# Patient Record
Sex: FEMALE | Race: OTHER | Employment: OTHER | URBAN - METROPOLITAN AREA
[De-identification: names, ages, dates, MRNs, and addresses within clinical notes are randomized per-mention and may not be internally consistent; named-entity substitution may affect disease eponyms.]

---

## 2017-03-05 ENCOUNTER — HOSPITAL ENCOUNTER (EMERGENCY)
Age: 32
Discharge: HOME | End: 2017-03-05
Attending: INTERNAL MEDICINE | Admitting: INTERNAL MEDICINE
Payer: MEDICAID

## 2017-03-05 VITALS
RESPIRATION RATE: 16 BRPM | WEIGHT: 183 LBS | OXYGEN SATURATION: 100 % | BODY MASS INDEX: 27.74 KG/M2 | DIASTOLIC BLOOD PRESSURE: 65 MMHG | HEART RATE: 82 BPM | HEIGHT: 68 IN | SYSTOLIC BLOOD PRESSURE: 131 MMHG | TEMPERATURE: 98.6 F

## 2017-03-05 DIAGNOSIS — R20.2 PARESTHESIA OF RIGHT FOOT: ICD-10-CM

## 2017-03-05 DIAGNOSIS — M54.31 SCIATICA, RIGHT SIDE: Primary | ICD-10-CM

## 2017-03-05 PROCEDURE — 99282 EMERGENCY DEPT VISIT SF MDM: CPT

## 2017-03-05 RX ORDER — HYDROCODONE BITARTRATE AND ACETAMINOPHEN 5; 325 MG/1; MG/1
1-2 TABLET ORAL
Qty: 12 TAB | Refills: 0 | Status: SHIPPED | OUTPATIENT
Start: 2017-03-05 | End: 2018-11-18

## 2017-03-05 RX ORDER — GABAPENTIN 300 MG/1
300 CAPSULE ORAL 3 TIMES DAILY
Qty: 90 CAP | Refills: 0 | Status: SHIPPED | OUTPATIENT
Start: 2017-03-05 | End: 2017-04-04

## 2017-03-05 RX ORDER — METHYLPREDNISOLONE 4 MG/1
TABLET ORAL
Qty: 1 DOSE PACK | Refills: 0 | Status: SHIPPED | OUTPATIENT
Start: 2017-03-05 | End: 2018-11-18

## 2017-03-05 NOTE — LETTER
[facility] [place] 
THE [facility] EMERGENCY DEPT 
[address] [address] 
[phone] Work/School Note Date: 3/5/2017 To Whom It May concern: 
 
Christiano Pandey was seen and treated today in the emergency room by the following provider(s): 
Attending Provider: Ivy Candelario MD 
Physician Assistant: Juan Vasquez PA-C. Christiano Pandey no heavy lifting for 7 days. Do not lift anything over 5 lbs. Sincerely, Guerita Allen PA-C

## 2017-03-05 NOTE — ED TRIAGE NOTES
C/o right foot pain and burning for 1 week, and right leg feels cold and like it has needles on it for 2 weeks. Pt seen at [facility] approx 2 weeks ago for the leg pain, denies having a family doctor to follow-up with. Denies injury.

## 2017-03-05 NOTE — ED PROVIDER NOTES
HPI Comments: 9:19 AM   Bony Dias is a 27 y.o. female presenting to the ED C/O burning right foot pain (8/10), onset 1 week ago. Symptoms include right leg \"coldness\" that onset 2 weeks ago. Pain is worse with walking and is only felt in the right foot/leg. Pt took 800 mg ibuprofen without relief. Pt was seen at [facility] 2 weeks ago for sharp groin pain, but did not follow up with a PCP or orthopedist. Pt had a negative hip x-ray and was prescribed 800 mh ibuprofen. Pt is unsure of diagnosis. 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 No trauma. Pt is otherwise healthy and without drug allergies. Pt denies h/o trauma, h/o DM, back pain, and any other Sx or complaints. Written by REINIER Lantigua, as dictated by Óscar Pierce PA-C. Patient is a 27 y.o. female presenting with leg pain. The history is provided by the patient. No  was used. Leg Pain    This is a new problem. The current episode started more than 1 week ago (1 week). The pain is present in the right foot. Associated symptoms include numbness (tingling). There has been no history of extremity trauma. Past Medical History:   Diagnosis Date    Anemia     Asthma     Genital herpes, unspecified     no outbreaks, dx by blood test    Rhesus isoimmunization unspecified as to episode of care in pregnancy        No past surgical history on file. Family History:   Problem Relation Age of Onset    Diabetes Maternal Grandmother     Diabetes Maternal Grandfather        Social History     Social History    Marital status: SINGLE     Spouse name: N/A    Number of children: N/A    Years of education: N/A     Occupational History    Not on file.      Social History Main Topics    Smoking status: Never Smoker    Smokeless tobacco: Never Used    Alcohol use No    Drug use: No    Sexual activity: Yes     Partners: Male     Birth control/ protection: Pill     Other Topics Concern    Not on file Social History Narrative         ALLERGIES: Latex    Review of Systems   Constitutional: Negative for fever. Musculoskeletal: Positive for arthralgias (Right foot). + Right leg \"coldness\"   Skin: Negative for color change and rash. Neurological: Positive for numbness (tingling). Negative for dizziness, weakness, light-headedness and headaches. Vitals:    03/05/17 0923   BP: 131/65   Pulse: 82   Resp: 16   Temp: 98.6 °F (37 °C)   SpO2: 100%   Weight: 83 kg (183 lb)   Height: 5' 8\" (1.727 m)            Physical Exam   Constitutional: She is oriented to person, place, and time. She appears well-developed and well-nourished. No distress. AA female in NAD. Alert. Ambulatory. Appears comfortable. HENT:   Head: Normocephalic and atraumatic. Right Ear: External ear normal.   Left Ear: External ear normal.   Nose: Nose normal.   Eyes: Conjunctivae are normal. Right eye exhibits no discharge. Left eye exhibits no discharge. No scleral icterus. Neck: Normal range of motion. Cardiovascular: Normal rate, regular rhythm, normal heart sounds and intact distal pulses. Exam reveals no gallop and no friction rub. No murmur heard. Pulses:       Dorsalis pedis pulses are 2+ on the right side, and 2+ on the left side. Posterior tibial pulses are 2+ on the right side, and 2+ on the left side. Pulmonary/Chest: Effort normal. No accessory muscle usage. No tachypnea. She has no decreased breath sounds. She has no rhonchi. Musculoskeletal: Normal range of motion. Right upper leg: She exhibits no tenderness, no bony tenderness, no swelling and no edema. Right lower leg: She exhibits no tenderness, no bony tenderness, no swelling, no edema and no deformity. Right foot: There is tenderness. There is normal range of motion, no bony tenderness, no swelling and no deformity. Neurological: She is alert and oriented to person, place, and time. Skin: Skin is warm and dry.  No rash noted. She is not diaphoretic. No erythema. Psychiatric: She has a normal mood and affect. Judgment normal.   Nursing note and vitals reviewed. RESULTS:    No orders to display        Labs Reviewed - No data to display    No results found for this or any previous visit (from the past 12 hour(s)). MDM  Number of Diagnoses or Management Options  Paresthesia of left foot:   Sciatica of left side:   Diagnosis management comments: Sciatica, gout, cellulitis, neuropathy, tendonitis, OA. Not c/w ischemic limb, compartment syndrome, septic joint, cellulitis. ED Course     MEDICATIONS GIVEN:  Medications - No data to display     Procedures    PROGRESS NOTE:   9:19 AM   Initial assessment performed. Written by Nathanael Pena ED Scribe, as dictated by Syeda Levine PA-C. Suspect sciatic as radiates from R hip. NVI. No evidence of infectious process. FU with Ortho. Not c/w ischemic limb, compartment syndrome, septic joint, or cellulitis. Reasons to RTED discussed with pt. All questions answered. Pt feels comfortable going home at this time. Pt expressed understanding and she agrees with plan. DISCHARGE NOTE:  9:44 AM   Bassam Lara  results have been reviewed with her. She has been counseled regarding her diagnosis, treatment, and plan. She verbally conveys understanding and agreement of the signs, symptoms, diagnosis, treatment and prognosis and additionally agrees to follow up as discussed. She also agrees with the care-plan and conveys that all of her questions have been answered. I have also provided discharge instructions for her that include: educational information regarding their diagnosis and treatment, and list of reasons why they would want to return to the ED prior to their follow-up appointment, should her condition change. The patient and/or family have been provided with education for proper Emergency Department utilization.      CLINICAL IMPRESSION:    1. Sciatica, right side    2. Paresthesia of right foot        AFTER VISIT PLAN:    Discharge Medication List as of 3/5/2017  9:53 AM      START taking these medications    Details   gabapentin (NEURONTIN) 300 mg capsule Take 1 Cap by mouth three (3) times daily for 30 days. Indications: NEUROPATHIC PAIN, Normal, Disp-90 Cap, R-0      methylPREDNISolone (MEDROL, KAMRAN,) 4 mg tablet Take with food. Start ibuprofen after this medication is complete., Normal, Disp-1 Dose Pack, R-0      HYDROcodone-acetaminophen (NORCO) 5-325 mg per tablet Take 1-2 Tabs by mouth every four (4) hours as needed for Pain. Max Daily Amount: 12 Tabs., Print, Disp-12 Tab, R-0         CONTINUE these medications which have NOT CHANGED    Details   IBUPROFEN (MOTRIN PO) Take 800 mg by mouth., Historical Med      norethindrone-ethinyl estradiol (ORTHO-NOVUM 1-35 TAB, NORTREL 1-35 TAB) 1-35 mg-mcg per tablet Take 1 Tab by mouth daily. , Historical Med      PHENAZOPYRIDINE HCL (AZO PO) Take  by mouth daily. , Historical Med              Follow-up Information     Follow up With Details Comments Contact Info    Nakul Abbott MD  for orthopedic follow up. [address] [place] [address]  [phone]      [facility] EMERGENCY DEPT  As needed, If symptoms worsen [address] [address]  [phone]           Attestations: This note is prepared by Jordan Cervantes, acting as Scribe for Guerita Allen PA-C. Guerita Allen PA-C: The scribe's documentation has been prepared under my direction and personally reviewed by me in its entirety. I confirm that the note above accurately reflects all work, treatment, procedures, and medical decision making performed by me.

## 2017-03-05 NOTE — DISCHARGE INSTRUCTIONS
Sciatica: Care Instructions  Your Care Instructions    Sciatica (say \"wgj-NR-zj-kuh\") is an irritation of one of the sciatic nerves, which come from the spinal cord in the lower back. The sciatic nerves and their branches extend down through the buttock to the foot. Sciatica can develop when an injured disc in the back presses against a spinal nerve root. Its main symptom is pain, numbness, or weakness that is often worse in the leg or foot than in the back. Sciatica often will improve and go away with time. Early treatment usually includes medicines and exercises to relieve pain. Follow-up care is a key part of your treatment and safety. Be sure to make and go to all appointments, and call your doctor if you are having problems. It's also a good idea to know your test results and keep a list of the medicines you take. How can you care for yourself at home? · Take pain medicines exactly as directed. ¨ If the doctor gave you a prescription medicine for pain, take it as prescribed. ¨ If you are not taking a prescription pain medicine, ask your doctor if you can take an over-the-counter medicine. · Use heat or ice to relieve pain. ¨ To apply heat, put a warm water bottle, heating pad set on low, or warm cloth on your back. Do not go to sleep with a heating pad on your skin. ¨ To use ice, put ice or a cold pack on the area for 10 to 20 minutes at a time. Put a thin cloth between the ice and your skin. · Avoid sitting if possible, unless it feels better than standing. · Alternate lying down with short walks. Increase your walking distance as you are able to without making your symptoms worse. · Do not do anything that makes your symptoms worse. When should you call for help? Call 911 anytime you think you may need emergency care. For example, call if:  · You are unable to move a leg at all.   Call your doctor now or seek immediate medical care if:  · You have new or worse symptoms in your legs or buttocks. Symptoms may include:  ¨ Numbness or tingling. ¨ Weakness. ¨ Pain. · You lose bladder or bowel control. Watch closely for changes in your health, and be sure to contact your doctor if:  · You are not getting better as expected. Where can you learn more? Go to http://bo-cachorro.info/. Enter [phone] in the search box to learn more about \"Sciatica: Care Instructions. \"  Current as of: May 23, 2016  Content Version: 11.1  © 1274-5459 [employer]. Care instructions adapted under license by [employer] (which disclaims liability or warranty for this information). If you have questions about a medical condition or this instruction, always ask your healthcare professional. [address] any warranty or liability for your use of this information.

## 2018-11-18 ENCOUNTER — HOSPITAL ENCOUNTER (EMERGENCY)
Age: 33
Discharge: HOME | End: 2018-11-18
Attending: EMERGENCY MEDICINE
Payer: MEDICAID

## 2018-11-18 VITALS
OXYGEN SATURATION: 100 % | RESPIRATION RATE: 16 BRPM | HEIGHT: 68 IN | TEMPERATURE: 98.5 F | DIASTOLIC BLOOD PRESSURE: 73 MMHG | BODY MASS INDEX: 29.07 KG/M2 | WEIGHT: 191.8 LBS | HEART RATE: 84 BPM | SYSTOLIC BLOOD PRESSURE: 120 MMHG

## 2018-11-18 DIAGNOSIS — N30.01 ACUTE CYSTITIS WITH HEMATURIA: Primary | ICD-10-CM

## 2018-11-18 DIAGNOSIS — Z3A.01 LESS THAN 8 WEEKS GESTATION OF PREGNANCY: ICD-10-CM

## 2018-11-18 DIAGNOSIS — O03.9 MISCARRIAGE: ICD-10-CM

## 2018-11-18 LAB
ABO + RH BLD: NORMAL
ALBUMIN SERPL-MCNC: 3.7 G/DL (ref 3.4–5)
ALBUMIN/GLOB SERPL: 1 {RATIO} (ref 0.8–1.7)
ALP SERPL-CCNC: 47 U/L (ref 45–117)
ALT SERPL-CCNC: 20 U/L (ref 13–56)
ANION GAP SERPL CALC-SCNC: 11 MMOL/L (ref 3–18)
APPEARANCE UR: ABNORMAL
AST SERPL-CCNC: 18 U/L (ref 15–37)
BACTERIA URNS QL MICRO: ABNORMAL /HPF
BASOPHILS # BLD: 0 K/UL (ref 0–0.1)
BASOPHILS NFR BLD: 0 % (ref 0–2)
BILIRUB SERPL-MCNC: 0.6 MG/DL (ref 0.2–1)
BILIRUB UR QL: ABNORMAL
BLOOD BANK CMNT PATIENT-IMP: NORMAL
BLOOD GROUP ANTIBODIES SERPL: NORMAL
BUN SERPL-MCNC: 11 MG/DL (ref 7–18)
BUN/CREAT SERPL: 15
CALCIUM SERPL-MCNC: 8.3 MG/DL (ref 8.5–10.1)
CHLORIDE SERPL-SCNC: 109 MMOL/L (ref 100–108)
CO2 SERPL-SCNC: 25 MMOL/L (ref 21–32)
COLOR UR: ABNORMAL
CREAT SERPL-MCNC: 0.72 MG/DL (ref 0.6–1.3)
DIFFERENTIAL METHOD BLD: NORMAL
EOSINOPHIL # BLD: 0.1 K/UL (ref 0–0.4)
EOSINOPHIL NFR BLD: 2 % (ref 0–5)
EPITH CASTS URNS QL MICRO: ABNORMAL /LPF (ref 0–5)
ERYTHROCYTE [DISTWIDTH] IN BLOOD BY AUTOMATED COUNT: 12.4 % (ref 11.6–14.5)
GLOBULIN SER CALC-MCNC: 3.7 G/DL (ref 2–4)
GLUCOSE SERPL-MCNC: 94 MG/DL (ref 74–99)
GLUCOSE UR STRIP.AUTO-MCNC: NEGATIVE MG/DL
HCG SERPL-ACNC: 4 MIU/ML (ref 0–10)
HCG UR QL: NEGATIVE
HCT VFR BLD AUTO: 38.9 % (ref 35–45)
HGB BLD-MCNC: 12.9 G/DL (ref 12–16)
HGB UR QL STRIP: NEGATIVE
KETONES UR QL STRIP.AUTO: ABNORMAL MG/DL
KOH PREP SPEC: NORMAL
LEUKOCYTE ESTERASE UR QL STRIP.AUTO: ABNORMAL
LIPASE SERPL-CCNC: 152 U/L (ref 73–393)
LYMPHOCYTES # BLD: 1.6 K/UL (ref 0.9–3.6)
LYMPHOCYTES NFR BLD: 22 % (ref 21–52)
MCH RBC QN AUTO: 29.7 PG (ref 24–34)
MCHC RBC AUTO-ENTMCNC: 33.2 G/DL (ref 31–37)
MCV RBC AUTO: 89.6 FL (ref 74–97)
MONOCYTES # BLD: 0.6 K/UL (ref 0.05–1.2)
MONOCYTES NFR BLD: 9 % (ref 3–10)
MUCOUS THREADS URNS QL MICRO: ABNORMAL /LPF
NEUTS SEG # BLD: 4.9 K/UL (ref 1.8–8)
NEUTS SEG NFR BLD: 67 % (ref 40–73)
NITRITE UR QL STRIP.AUTO: POSITIVE
PH UR STRIP: 7 [PH] (ref 5–8)
PLATELET # BLD AUTO: 313 K/UL (ref 135–420)
PMV BLD AUTO: 9.4 FL (ref 9.2–11.8)
POTASSIUM SERPL-SCNC: 3.7 MMOL/L (ref 3.5–5.5)
PROT SERPL-MCNC: 7.4 G/DL (ref 6.4–8.2)
PROT UR STRIP-MCNC: 100 MG/DL
RBC # BLD AUTO: 4.34 M/UL (ref 4.2–5.3)
RBC #/AREA URNS HPF: ABNORMAL /HPF (ref 0–5)
SERVICE CMNT-IMP: NORMAL
SERVICE CMNT-IMP: NORMAL
SODIUM SERPL-SCNC: 145 MMOL/L (ref 136–145)
SP GR UR REFRACTOMETRY: 1.02 (ref 1–1.03)
UROBILINOGEN UR QL STRIP.AUTO: 4 EU/DL (ref 0.2–1)
WBC # BLD AUTO: 7.3 K/UL (ref 4.6–13.2)
WBC URNS QL MICRO: ABNORMAL /HPF (ref 0–5)
WET PREP GENITAL: NORMAL

## 2018-11-18 PROCEDURE — 99284 EMERGENCY DEPT VISIT MOD MDM: CPT

## 2018-11-18 PROCEDURE — 85025 COMPLETE CBC W/AUTO DIFF WBC: CPT

## 2018-11-18 PROCEDURE — 74011250636 HC RX REV CODE- 250/636: Performed by: EMERGENCY MEDICINE

## 2018-11-18 PROCEDURE — 96372 THER/PROPH/DIAG INJ SC/IM: CPT

## 2018-11-18 PROCEDURE — 81025 URINE PREGNANCY TEST: CPT

## 2018-11-18 PROCEDURE — 83690 ASSAY OF LIPASE: CPT

## 2018-11-18 PROCEDURE — 87210 SMEAR WET MOUNT SALINE/INK: CPT

## 2018-11-18 PROCEDURE — 81001 URINALYSIS AUTO W/SCOPE: CPT

## 2018-11-18 PROCEDURE — 74011250637 HC RX REV CODE- 250/637: Performed by: EMERGENCY MEDICINE

## 2018-11-18 PROCEDURE — 86900 BLOOD TYPING SEROLOGIC ABO: CPT

## 2018-11-18 PROCEDURE — 86850 RBC ANTIBODY SCREEN: CPT

## 2018-11-18 PROCEDURE — 84702 CHORIONIC GONADOTROPIN TEST: CPT

## 2018-11-18 PROCEDURE — 80053 COMPREHEN METABOLIC PANEL: CPT

## 2018-11-18 PROCEDURE — 87491 CHLMYD TRACH DNA AMP PROBE: CPT

## 2018-11-18 RX ORDER — ACETAMINOPHEN 500 MG
1000 TABLET ORAL
Status: COMPLETED | OUTPATIENT
Start: 2018-11-18 | End: 2018-11-18

## 2018-11-18 RX ORDER — ONDANSETRON 2 MG/ML
4 INJECTION INTRAMUSCULAR; INTRAVENOUS
Status: DISCONTINUED | OUTPATIENT
Start: 2018-11-18 | End: 2018-11-18 | Stop reason: HOSPADM

## 2018-11-18 RX ADMIN — ACETAMINOPHEN 1000 MG: 500 TABLET ORAL at 09:50

## 2018-11-18 RX ADMIN — HUMAN RHO(D) IMMUNE GLOBULIN 0.3 MG: 300 INJECTION, SOLUTION INTRAMUSCULAR at 10:57

## 2018-11-18 NOTE — ED PROVIDER NOTES
EMERGENCY DEPARTMENT HISTORY AND PHYSICAL EXAM 
 
Date: 2018 Patient Name: Clarissa Dillon History of Presenting Illness Chief Complaint Patient presents with  Vaginal Bleeding  Abdominal Cramping History Provided By: Patient Chief Complaint: lower abd pain Duration: since yesterday Timing:  Gradually worsening Location: lower abdomen Quality: Cramping Associated Symptoms: nausea (morning) and vaginal bleeding Additional History (Context):  
8:13 AM 
Clarissa Dillon is a  28 y.o. female with PMHX of asthma and anemia who presents to the emergency department C/O cramping lower abd pain since yesterday. Associated sxs include nausea (morning) and vaginal bleeding. Pt states she is almost soaking through 2 pads at home. Pt states she took a home pregnancy test yesterday which confirmed pregnancy and another done this morning which was negative. She did the same this morning and had the same results with one confirming pregnancy and one negative. LNMP was 10/16/2018. Pt blood type is A negative. Pt is sexually active without using contraception. Allergy reported to Latex. Pt does not endorse tobacco product use, EtOH user, or recreational drug user. No PSHx. Pt denies vomiting, fever, chills, and any other sxs or complaints. PCP: None Current Facility-Administered Medications Medication Dose Route Frequency Provider Last Rate Last Dose  ondansetron (ZOFRAN) injection 4 mg  4 mg IntraVENous NOW Sotero Aguilar MD      
 Rho D immune globulin (RHOGAM) 1,500 unit (300 mcg) injection 0.3 mg  300 mcg IntraMUSCular ONCE Buddy Dorsey MD      
 
 
Past History Past Medical History: 
Past Medical History:  
Diagnosis Date  Anemia  Asthma  Genital herpes, unspecified   
 no outbreaks, dx by blood test  
 Rhesus isoimmunization unspecified as to episode of care in pregnancy Past Surgical History: History reviewed. No pertinent surgical history. Family History: 
Family History Problem Relation Age of Onset  Diabetes Maternal Grandmother  Diabetes Maternal Grandfather Social History: 
Social History Tobacco Use  Smoking status: Never Smoker  Smokeless tobacco: Never Used Substance Use Topics  Alcohol use: No  
 Drug use: No  
 
 
Allergies: Allergies Allergen Reactions  Latex Rash Review of Systems Review of Systems Constitutional: Negative for chills and fever. Gastrointestinal: Positive for abdominal pain and nausea. Negative for vomiting. Genitourinary: Positive for vaginal bleeding. All other systems reviewed and are negative. Physical Exam  
 
Vitals:  
 11/18/18 7254 BP: 120/73 Pulse: 84 Resp: 16 Temp: 98.5 °F (36.9 °C) SpO2: 100% Weight: 87 kg (191 lb 12.8 oz) Height: 5' 8\" (1.727 m) Physical Exam  
Nursing note and vitals reviewed. Vital signs and nursing notes reviewed CONSTITUTIONAL: Alert, in no apparent distress; well-developed; well-nourished. HEAD:  Normocephalic, atraumatic EYES: PERRL; EOM's intact. ENTM: Nose: no rhinorrhea; Throat: no erythema or exudate, mucous membranes moist 
Neck:  No JVD, supple without lymphadenopathy RESP: Chest clear, equal breath sounds. CV: S1 and S2 WNL; No murmurs, gallops or rubs. GI: Normal bowel sounds, abdomen soft. Suprapubic tenderness. No masses or organomegaly. : No costo-vertebral angle tenderness. BACK:  Non-tender UPPER EXT:  Normal inspection LOWER EXT: No edema, no calf tenderness. Distal pulses intact. NEURO: CN intact, reflexes 2/4 and sym, strength 5/5 and sym, sensation intact. SKIN: No rashes; Normal for age and stage. PSYCH:  Alert and oriented, normal affect. Diagnostic Study Results Labs - Recent Results (from the past 12 hour(s)) URINALYSIS W/ RFLX MICROSCOPIC Collection Time: 11/18/18  8:05 AM  
Result Value Ref Range Color RED Appearance CLOUDY Specific gravity 1.025 1.003 - 1.030    
 pH (UA) 7.0 5.0 - 8.0 Protein 100 (A) NEG mg/dL Glucose NEGATIVE  NEG mg/dL Ketone TRACE (A) NEG mg/dL Bilirubin LARGE (A) NEG Blood NEGATIVE  NEG Urobilinogen 4.0 (H) 0.2 - 1.0 EU/dL Nitrites POSITIVE (A) NEG Leukocyte Esterase SMALL (A) NEG URINE MICROSCOPIC ONLY Collection Time: 11/18/18  8:05 AM  
Result Value Ref Range WBC 4 to 10 0 - 5 /hpf  
 RBC TOO NUMEROUS TO COUNT 0 - 5 /hpf Epithelial cells 2+ 0 - 5 /lpf Bacteria 1+ (A) NEG /hpf Mucus 1+ (A) NEG /lpf  
HCG URINE, QL. - POC Collection Time: 11/18/18  8:14 AM  
Result Value Ref Range Pregnancy test,urine (POC) NEGATIVE  NEG    
CBC WITH AUTOMATED DIFF Collection Time: 11/18/18  8:30 AM  
Result Value Ref Range WBC 7.3 4.6 - 13.2 K/uL  
 RBC 4.34 4.20 - 5.30 M/uL  
 HGB 12.9 12.0 - 16.0 g/dL HCT 38.9 35.0 - 45.0 % MCV 89.6 74.0 - 97.0 FL  
 MCH 29.7 24.0 - 34.0 PG  
 MCHC 33.2 31.0 - 37.0 g/dL  
 RDW 12.4 11.6 - 14.5 % PLATELET 110 790 - 997 K/uL MPV 9.4 9.2 - 11.8 FL  
 NEUTROPHILS 67 40 - 73 % LYMPHOCYTES 22 21 - 52 % MONOCYTES 9 3 - 10 % EOSINOPHILS 2 0 - 5 % BASOPHILS 0 0 - 2 %  
 ABS. NEUTROPHILS 4.9 1.8 - 8.0 K/UL  
 ABS. LYMPHOCYTES 1.6 0.9 - 3.6 K/UL  
 ABS. MONOCYTES 0.6 0.05 - 1.2 K/UL  
 ABS. EOSINOPHILS 0.1 0.0 - 0.4 K/UL  
 ABS. BASOPHILS 0.0 0.0 - 0.1 K/UL  
 DF AUTOMATED METABOLIC PANEL, COMPREHENSIVE Collection Time: 11/18/18  8:30 AM  
Result Value Ref Range Sodium 145 136 - 145 mmol/L Potassium 3.7 3.5 - 5.5 mmol/L Chloride 109 (H) 100 - 108 mmol/L  
 CO2 25 21 - 32 mmol/L Anion gap 11 3.0 - 18 mmol/L Glucose 94 74 - 99 mg/dL BUN 11 7.0 - 18 MG/DL Creatinine 0.72 0.6 - 1.3 MG/DL  
 BUN/Creatinine ratio 15 GFR est AA >60 >60 ml/min/1.73m2 GFR est non-AA >60 >60 ml/min/1.73m2  Calcium 8.3 (L) 8.5 - 10.1 MG/DL  
 Bilirubin, total 0.6 0.2 - 1.0 MG/DL  
 ALT (SGPT) 20 13 - 56 U/L  
 AST (SGOT) 18 15 - 37 U/L Alk. phosphatase 47 45 - 117 U/L Protein, total 7.4 6.4 - 8.2 g/dL Albumin 3.7 3.4 - 5.0 g/dL Globulin 3.7 2.0 - 4.0 g/dL A-G Ratio 1.0 0.8 - 1.7 LIPASE Collection Time: 11/18/18  8:30 AM  
Result Value Ref Range Lipase 152 73 - 393 U/L  
BETA HCG, QT Collection Time: 11/18/18  8:30 AM  
Result Value Ref Range Beta HCG, QT 4 0 - 10 MIU/ML  
BLOOD TYPE, (ABO+RH) Collection Time: 11/18/18  8:30 AM  
Result Value Ref Range ABO/Rh(D) A NEGATIVE   
WET PREP Collection Time: 11/18/18  9:05 AM  
Result Value Ref Range Special Requests: NO SPECIAL REQUESTS Wet prep NO YEAST,TRICHOMONAS OR CLUE CELLS NOTED    
KOH, OTHER SOURCES Collection Time: 11/18/18  9:05 AM  
Result Value Ref Range Special Requests: NO SPECIAL REQUESTS    
 KOH NO FUNGAL ELEMENTS SEEN Radiologic Studies - No orders to display CT Results  (Last 48 hours) None CXR Results  (Last 48 hours) None Medications given in the ED- Medications  
ondansetron (ZOFRAN) injection 4 mg (4 mg IntraVENous Refused 11/18/18 0831) Rho D immune globulin (RHOGAM) 1,500 unit (300 mcg) injection 0.3 mg (not administered)  
acetaminophen (TYLENOL) tablet 1,000 mg (1,000 mg Oral Given 11/18/18 0950) Medical Decision Making I am the first provider for this patient. I reviewed the vital signs, available nursing notes, past medical history, past surgical history, family history and social history. Vital Signs-Reviewed the patient's vital signs. Pulse Oximetry Analysis - 100% on room air. Records Reviewed: Nursing Notes and Old Medical Records Provider Notes (Medical Decision Making): DDx: menses, miscarriage, early pregnancy, ectopic, UTI. Procedures: 
Procedures Exam was chaperoned by Ebonie Quiroz, female RN. Normal external genitalia. Painless. No masses. Blood and small clots in the vault. Normal cervix appearance. On bimanual exam, no cervical, uterine, or adnexal tenderness. ED Course:  
8:13 AM Initial assessment performed. The patients presenting problems have been discussed, and they are in agreement with the care plan formulated and outlined with them. I have encouraged them to ask questions as they arise throughout their visit. Discussion: 28year old female with a likely first trimester miscarriage A- and bleeding. Positive home pregnancy test. Will give Rhogam shot. Diagnosis and Disposition DISCHARGE NOTE: 
10:42 AM 
Mica Elder  results have been reviewed with her. She has been counseled regarding her diagnosis, treatment, and plan. She verbally conveys understanding and agreement of the signs, symptoms, diagnosis, treatment and prognosis and additionally agrees to follow up as discussed. She also agrees with the care-plan and conveys that all of her questions have been answered. I have also provided discharge instructions for her that include: educational information regarding their diagnosis and treatment, and list of reasons why they would want to return to the ED prior to their follow-up appointment, should her condition change. She has been provided with education for proper emergency department utilization. CLINICAL IMPRESSION: 
 
1. Acute cystitis with hematuria 2. Miscarriage 3. Less than 8 weeks gestation of pregnancy PLAN: 
1. D/C Home 2. There are no discharge medications for this patient. 3.  
Follow-up Information Follow up With Specialties Details Why Contact Pan Menendez Officer, MD Obstetrics & Gynecology, Gynecology, Obstetrics Schedule an appointment as soon as possible for a visit in 2 days For Ob/GYN Follow Up [address] 
[phone] [facility] EMERGENCY DEPT Emergency Medicine Go to If symptoms worsen, As needed [address] 
[address] [id number] 
[phone]  
  
 
_______________________________ Attestations: This note is prepared by Mariya Coker, acting as Scribe for Gabby Lema MD. 
 
Gabby Lema MD:  The scribe's documentation has been prepared under my direction and personally reviewed by me in its entirety. I confirm that the note above accurately reflects all work, treatment, procedures, and medical decision making performed by me. 
_______________________________

## 2018-11-18 NOTE — ED TRIAGE NOTES
Pt with complaints of suprapubic cramping starting yesterday. Pt states she took a pregnancy test yesterday with a positive result, but retook one this morning with negative result. Pt also reports vaginal bleeding starting this morning.

## 2018-11-18 NOTE — DISCHARGE INSTRUCTIONS
Miscarriage: Care Instructions  Your Care Instructions    The loss of a pregnancy can be very hard. You may wonder why it happened or blame yourself. Miscarriages are common and are not caused by exercise, stress, or sex. Most happen because the fertilized egg in the uterus does not develop normally. There is no treatment that can stop a miscarriage. As long as you do not have heavy blood loss, fever, weakness, or other signs of infection, you can let a miscarriage follow its own course. This can take several days. Your body will recover over the next several weeks. Having a miscarriage does not mean you cannot have a normal pregnancy in the future. The doctor has checked you carefully, but problems can develop later. If you notice any problems or new symptoms, get medical treatment right away. Follow-up care is a key part of your treatment and safety. Be sure to make and go to all appointments, and call your doctor if you are having problems. It's also a good idea to know your test results and keep a list of the medicines you take. How can you care for yourself at home? · You will probably have some vaginal bleeding for 1 to 2 weeks. It may be similar to or slightly heavier than a normal period. The bleeding should get lighter after a week. Use pads instead of tampons. You may use tampons during your next period, which should start in 3 to 6 weeks. · Take an over-the-counter pain medicine, such as acetaminophen (Tylenol), ibuprofen (Advil, Motrin), or naproxen (Aleve) for cramps. Read and follow all instructions on the label. You may have cramps for several days after the miscarriage. · Do not take two or more pain medicines at the same time unless the doctor told you to. Many pain medicines have acetaminophen, which is Tylenol. Too much acetaminophen (Tylenol) can be harmful. · Use a clear container to save any tissue that you pass. Take it to your doctor's office as soon as you can.   · Do not have sex until the bleeding stops. · You may return to your normal activities if you feel well enough to do so. But you should avoid heavy exercise until the bleeding stops. · If you plan to get pregnant again, check with your doctor. Most doctors suggest waiting until you have had at least one normal period before you try to get pregnant. · If you do not want to get pregnant, ask your doctor about birth control. You can get pregnant again before your next period starts if you are not using birth control. · You may be low in iron because of blood loss. Eat a balanced diet that is high in iron and vitamin C. Foods rich in iron include red meat, shellfish, eggs, beans, and leafy green vegetables. Foods high in vitamin C include citrus fruits, tomatoes, and broccoli. Talk to your doctor about whether you need to take iron pills or a multivitamin. · The loss of a pregnancy can be very hard. You may wonder why it happened and blame yourself. Talking to family members, friends, a counselor, or your doctor may help you cope with your loss. When should you call for help? Call 911 anytime you think you may need emergency care. For example, call if:    · You passed out (lost consciousness).    Call your doctor now or seek immediate medical care if:    · You have severe vaginal bleeding.     · You are dizzy or lightheaded, or you feel like you may faint.     · You have new or worse pain in your belly or pelvis.     · You have a fever.     · You have vaginal discharge that smells bad.    Watch closely for changes in your health, and be sure to contact your doctor if:    · You do not get better as expected. Where can you learn more? Go to http://bo-cachorro.info/. Enter E802 in the search box to learn more about \"Miscarriage: Care Instructions. \"  Current as of: November 21, 2017  Content Version: 11.8  © 7304-9546 [employer], [employer].  Care instructions adapted under license by [employer] (which disclaims liability or warranty for this information). If you have questions about a medical condition or this instruction, always ask your healthcare professional. [place] [address] any warranty or liability for your use of this information. No intercourse for 6 weeks. Urinary Tract Infection in Women: Care Instructions  Your Care Instructions    A urinary tract infection, or UTI, is a general term for an infection anywhere between the kidneys and the urethra (where urine comes out). Most UTIs are bladder infections. They often cause pain or burning when you urinate. UTIs are caused by bacteria and can be cured with antibiotics. Be sure to complete your treatment so that the infection goes away. Follow-up care is a key part of your treatment and safety. Be sure to make and go to all appointments, and call your doctor if you are having problems. It's also a good idea to know your test results and keep a list of the medicines you take. How can you care for yourself at home? · Take your antibiotics as directed. Do not stop taking them just because you feel better. You need to take the full course of antibiotics. · Drink extra water and other fluids for the next day or two. This may help wash out the bacteria that are causing the infection. (If you have kidney, heart, or liver disease and have to limit fluids, talk with your doctor before you increase your fluid intake.)  · Avoid drinks that are carbonated or have caffeine. They can irritate the bladder. · Urinate often. Try to empty your bladder each time. · To relieve pain, take a hot bath or lay a heating pad set on low over your lower belly or genital area. Never go to sleep with a heating pad in place. To prevent UTIs  · Drink plenty of water each day. This helps you urinate often, which clears bacteria from your system.  (If you have kidney, heart, or liver disease and have to limit fluids, talk with your doctor before you increase your fluid intake.)  · Urinate when you need to. · Urinate right after you have sex. · Change sanitary pads often. · Avoid douches, bubble baths, feminine hygiene sprays, and other feminine hygiene products that have deodorants. · After going to the bathroom, wipe from front to back. When should you call for help? Call your doctor now or seek immediate medical care if:    · Symptoms such as fever, chills, nausea, or vomiting get worse or appear for the first time.     · You have new pain in your back just below your rib cage. This is called flank pain.     · There is new blood or pus in your urine.     · You have any problems with your antibiotic medicine.    Watch closely for changes in your health, and be sure to contact your doctor if:    · You are not getting better after taking an antibiotic for 2 days.     · Your symptoms go away but then come back. Where can you learn more? Go to http://bo-cachorro.info/. Enter O257 in the search box to learn more about \"Urinary Tract Infection in Women: Care Instructions. \"  Current as of: March 21, 2018  Content Version: 11.8  © 3649-0120 [employer]. Care instructions adapted under license by [employer] (which disclaims liability or warranty for this information). If you have questions about a medical condition or this instruction, always ask your healthcare professional. [address] any warranty or liability for your use of this information.

## 2018-11-19 LAB
BLD PROD TYP BPU: NORMAL
BPU ID: NORMAL
C TRACH RRNA SPEC QL NAA+PROBE: NEGATIVE
CALLED TO:,BCALL1: NORMAL
N GONORRHOEA RRNA SPEC QL NAA+PROBE: NEGATIVE
SPECIMEN SOURCE: NORMAL
STATUS OF UNIT,%ST: NORMAL
UNIT DIVISION, %UDIV: 0

## 2019-06-24 ENCOUNTER — HOSPITAL ENCOUNTER (OUTPATIENT)
Dept: INFUSION THERAPY | Age: 34
Discharge: HOME | End: 2019-06-24
Payer: MEDICAID

## 2019-06-24 VITALS
DIASTOLIC BLOOD PRESSURE: 57 MMHG | OXYGEN SATURATION: 100 % | RESPIRATION RATE: 18 BRPM | HEART RATE: 98 BPM | SYSTOLIC BLOOD PRESSURE: 127 MMHG | TEMPERATURE: 98.6 F

## 2019-06-24 LAB
25(OH)D3 SERPL-MCNC: 22 NG/ML (ref 30–100)
ABO + RH BLD: NORMAL
BLOOD GROUP ANTIBODIES SERPL: NORMAL
ERYTHROCYTE [DISTWIDTH] IN BLOOD BY AUTOMATED COUNT: 12.8 % (ref 11.6–14.5)
GLUCOSE 1H P 100 G GLC PO SERPL-MCNC: 129 MG/DL (ref 60–140)
HCT VFR BLD AUTO: 30.8 % (ref 35–45)
HGB BLD-MCNC: 10.3 G/DL (ref 12–16)
MCH RBC QN AUTO: 30.1 PG (ref 24–34)
MCHC RBC AUTO-ENTMCNC: 33.4 G/DL (ref 31–37)
MCV RBC AUTO: 90.1 FL (ref 74–97)
PLATELET # BLD AUTO: 291 K/UL (ref 135–420)
PMV BLD AUTO: 9.2 FL (ref 9.2–11.8)
RBC # BLD AUTO: 3.42 M/UL (ref 4.2–5.3)
RPR SER QL: NONREACTIVE
SPECIMEN EXP DATE BLD: NORMAL
WBC # BLD AUTO: 8 K/UL (ref 4.6–13.2)

## 2019-06-24 PROCEDURE — 36415 COLL VENOUS BLD VENIPUNCTURE: CPT

## 2019-06-24 PROCEDURE — 86900 BLOOD TYPING SEROLOGIC ABO: CPT

## 2019-06-24 PROCEDURE — 85027 COMPLETE CBC AUTOMATED: CPT

## 2019-06-24 PROCEDURE — 86592 SYPHILIS TEST NON-TREP QUAL: CPT

## 2019-06-24 PROCEDURE — 82950 GLUCOSE TEST: CPT

## 2019-06-24 PROCEDURE — 82306 VITAMIN D 25 HYDROXY: CPT

## 2019-06-24 PROCEDURE — 96372 THER/PROPH/DIAG INJ SC/IM: CPT

## 2019-06-24 PROCEDURE — 74011250636 HC RX REV CODE- 250/636: Performed by: OBSTETRICS & GYNECOLOGY

## 2019-06-24 RX ADMIN — HUMAN RHO(D) IMMUNE GLOBULIN 0.3 MG: 300 INJECTION, SOLUTION INTRAMUSCULAR at 14:56

## 2019-06-24 NOTE — PROGRESS NOTES
[facility] Progress Note    Date: 2019    Name: Pastor Turner    MRN: [id number]         : 1985    Rhogam Injection      Ms. Barbosa to [facility], ambulatory at 1430. Pt was assessed and education was provided. Pt given written info about Rhogam; explained reason for giving to Rh negative mothers. Pt verbalized understanding and signed consent form. Ms. Albarado  vitals were reviewed and patient was observed for 5 minutes prior to treatment. Visit Vitals  /57 (BP 1 Location: Right arm)   Pulse 98   Temp 98.6 °F (37 °C)   Resp 18   SpO2 100%   Breastfeeding? No       Lab results were obtained and reviewed. Recent Results (from the past 12 hour(s))   TYPE & SCREEN    Collection Time: 19  1:03 PM   Result Value Ref Range    Crossmatch Expiration 2019     ABO/Rh(D) A NEGATIVE     Antibody screen NEG    RH IMMUNE GLOBULIN PROPHYLACTIC    Collection Time: 19  1:03 PM   Result Value Ref Range    No. of weeks gestation 27      Unit number [id number]     Blood component type RH IMMUNE GLOBULIN     Unit division 00     Status of unit ISSUED      Pt's blood type is A negative, which is within ordered parameters to give Rhogam today. Rhogam 300 mcg was administered IM in left deltoid. No irritation or bleeding noted at site. Bandaid applied. Ms. Gisele Agee tolerated well, and had no complaints. Pt observed for 30 minutes post-injection. She denied itching/hives/rash, SOB, difficulty swallowing or speaking, chest pain, any swelling or any other signs of allergic reaction. Pt given printed copy of discharge instructions; reviewed with her symptoms that require medical attention. Pt given Rhogam card with lot #, expiration date, etc.     Patient armband removed and shredded. Ms. Gisele Agee was discharged from [place] [address] in stable condition at 1500. She is to follow-up with Dr. Jitendra Parr as instructed.     Can Arguello RN  2019

## 2019-06-25 LAB
BLD PROD TYP BPU: NORMAL
BPU ID: NORMAL
GA (WEEKS): 27 WK
STATUS OF UNIT,%ST: NORMAL
UNIT DIVISION, %UDIV: 0

## 2021-06-01 ENCOUNTER — HOSPITAL ENCOUNTER (EMERGENCY)
Dept: ULTRASOUND IMAGING | Age: 36
Discharge: HOME | End: 2021-06-01
Attending: EMERGENCY MEDICINE
Payer: MEDICAID

## 2021-06-01 ENCOUNTER — HOSPITAL ENCOUNTER (EMERGENCY)
Age: 36
Discharge: HOME | End: 2021-06-01
Attending: EMERGENCY MEDICINE
Payer: MEDICAID

## 2021-06-01 VITALS
HEIGHT: 68 IN | SYSTOLIC BLOOD PRESSURE: 147 MMHG | RESPIRATION RATE: 18 BRPM | HEART RATE: 78 BPM | DIASTOLIC BLOOD PRESSURE: 111 MMHG | OXYGEN SATURATION: 100 % | TEMPERATURE: 97.3 F | BODY MASS INDEX: 27.58 KG/M2 | WEIGHT: 182 LBS

## 2021-06-01 DIAGNOSIS — R10.2 PELVIC CRAMPING: ICD-10-CM

## 2021-06-01 DIAGNOSIS — O02.1 FETAL DEMISE BEFORE 20 WEEKS WITH RETENTION OF DEAD FETUS: Primary | ICD-10-CM

## 2021-06-01 LAB
ABO + RH BLD: NORMAL
ALBUMIN SERPL-MCNC: 3.9 G/DL (ref 3.4–5)
ALBUMIN/GLOB SERPL: 1.1 {RATIO} (ref 0.8–1.7)
ALP SERPL-CCNC: 41 U/L (ref 45–117)
ALT SERPL-CCNC: 15 U/L (ref 13–56)
ANION GAP SERPL CALC-SCNC: 7 MMOL/L (ref 3–18)
APPEARANCE UR: ABNORMAL
AST SERPL-CCNC: 11 U/L (ref 10–38)
BASOPHILS # BLD: 0 K/UL (ref 0–0.1)
BASOPHILS NFR BLD: 1 % (ref 0–2)
BILIRUB SERPL-MCNC: 0.7 MG/DL (ref 0.2–1)
BILIRUB UR QL: NEGATIVE
BLOOD GROUP ANTIBODIES SERPL: NORMAL
BUN SERPL-MCNC: 11 MG/DL (ref 7–18)
BUN/CREAT SERPL: 16 (ref 12–20)
CALCIUM SERPL-MCNC: 9 MG/DL (ref 8.5–10.1)
CHLORIDE SERPL-SCNC: 105 MMOL/L (ref 100–111)
CO2 SERPL-SCNC: 27 MMOL/L (ref 21–32)
COLOR UR: YELLOW
CREAT SERPL-MCNC: 0.69 MG/DL (ref 0.6–1.3)
DIFFERENTIAL METHOD BLD: ABNORMAL
EOSINOPHIL # BLD: 0.2 K/UL (ref 0–0.4)
EOSINOPHIL NFR BLD: 3 % (ref 0–5)
ERYTHROCYTE [DISTWIDTH] IN BLOOD BY AUTOMATED COUNT: 11.8 % (ref 11.6–14.5)
GLOBULIN SER CALC-MCNC: 3.7 G/DL (ref 2–4)
GLUCOSE SERPL-MCNC: 101 MG/DL (ref 74–99)
GLUCOSE UR STRIP.AUTO-MCNC: NEGATIVE MG/DL
HCG SERPL-ACNC: ABNORMAL MIU/ML (ref 0–10)
HCT VFR BLD AUTO: 37.9 % (ref 35–45)
HGB BLD-MCNC: 12.5 G/DL (ref 12–16)
HGB UR QL STRIP: NEGATIVE
KETONES UR QL STRIP.AUTO: ABNORMAL MG/DL
LEUKOCYTE ESTERASE UR QL STRIP.AUTO: NEGATIVE
LYMPHOCYTES # BLD: 2.3 K/UL (ref 0.9–3.6)
LYMPHOCYTES NFR BLD: 39 % (ref 21–52)
MCH RBC QN AUTO: 29.9 PG (ref 24–34)
MCHC RBC AUTO-ENTMCNC: 33 G/DL (ref 31–37)
MCV RBC AUTO: 90.7 FL (ref 74–97)
MONOCYTES # BLD: 0.6 K/UL (ref 0.05–1.2)
MONOCYTES NFR BLD: 10 % (ref 3–10)
NEUTS SEG # BLD: 2.9 K/UL (ref 1.8–8)
NEUTS SEG NFR BLD: 47 % (ref 40–73)
NITRITE UR QL STRIP.AUTO: NEGATIVE
PH UR STRIP: 6.5 [PH] (ref 5–8)
PLATELET # BLD AUTO: 313 K/UL (ref 135–420)
PMV BLD AUTO: 9.3 FL (ref 9.2–11.8)
POTASSIUM SERPL-SCNC: 3.4 MMOL/L (ref 3.5–5.5)
PROT SERPL-MCNC: 7.6 G/DL (ref 6.4–8.2)
PROT UR STRIP-MCNC: NEGATIVE MG/DL
RBC # BLD AUTO: 4.18 M/UL (ref 4.2–5.3)
SODIUM SERPL-SCNC: 139 MMOL/L (ref 136–145)
SP GR UR REFRACTOMETRY: 1.03 (ref 1–1.03)
SPECIMEN EXP DATE BLD: NORMAL
UROBILINOGEN UR QL STRIP.AUTO: 1 EU/DL (ref 0.2–1)
WBC # BLD AUTO: 6.1 K/UL (ref 4.6–13.2)

## 2021-06-01 PROCEDURE — 80053 COMPREHEN METABOLIC PANEL: CPT

## 2021-06-01 PROCEDURE — 81003 URINALYSIS AUTO W/O SCOPE: CPT

## 2021-06-01 PROCEDURE — 86901 BLOOD TYPING SEROLOGIC RH(D): CPT

## 2021-06-01 PROCEDURE — 93975 VASCULAR STUDY: CPT

## 2021-06-01 PROCEDURE — 84702 CHORIONIC GONADOTROPIN TEST: CPT

## 2021-06-01 PROCEDURE — 85025 COMPLETE CBC W/AUTO DIFF WBC: CPT

## 2021-06-01 PROCEDURE — 99283 EMERGENCY DEPT VISIT LOW MDM: CPT

## 2021-06-01 NOTE — ED PROVIDER NOTES
EMERGENCY DEPARTMENT HISTORY AND PHYSICAL EXAM    Date: 6/1/2021  Patient Name: Lisseth Pittman    History of Presenting Illness     Chief Complaint   Patient presents with    Pregnancy Problem         History Provided By: Patient    Lisseth Pittman is a 38 y.o. female who presents to the emergency department C/O pregnancy problem. Patient states she is currently 11 weeks pregnant. States she is having some spotting and pelvic cramping. Patient states her symptoms started this morning. She does admit to being \"intermittent \"with her partner yesterday prior to the symptoms starting. States this is her sixth pregnancy, has 4 children and has had 1 miscarriage. She states she was concern for possible miscarriage. States she is following with an OB/GYN for this patient and has had an ultrasound around 7 to 8 weeks and was normal at that time. No other complaints. Patient states she called her OB/GYN who recommended she come to the emergency department for evaluation        PCP: Billie Chance, DO    Current Outpatient Medications   Medication Sig Dispense Refill    PNV66-Iron Fumarate-FA-DSS-DHA 26-1.2- mg cap Take  by mouth. Past History     Past Medical History:  Past Medical History:   Diagnosis Date    Anemia     Asthma     Genital herpes, unspecified     no outbreaks, dx by blood test    Rhesus isoimmunization unspecified as to episode of care in pregnancy        Past Surgical History:  History reviewed. No pertinent surgical history. Family History:  Family History   Problem Relation Age of Onset    Diabetes Maternal Grandmother     Diabetes Maternal Grandfather        Social History:  Social History     Tobacco Use    Smoking status: Never Smoker    Smokeless tobacco: Never Used   Substance Use Topics    Alcohol use: No    Drug use: No       Allergies:   Allergies   Allergen Reactions    Latex Rash         Review of Systems   Review of Systems   Constitutional: Negative for fever. Respiratory: Negative for shortness of breath. Cardiovascular: Negative for chest pain. Gastrointestinal: Negative for abdominal pain, nausea and vomiting. Genitourinary: Positive for pelvic pain and vaginal bleeding. Negative for hematuria, urgency, vaginal discharge and vaginal pain. All other systems reviewed and are negative. All other systems reviewed and are negative.     Physical Exam     Vitals:    06/01/21 1100   BP: (!) 117/51   Pulse: 81   Resp: 14   Temp: 97.3 °F (36.3 °C)   SpO2: 100%   Weight: 82.6 kg (182 lb)   Height: 5' 8\" (1.727 m)     Physical Exam    Nursing notes and vital signs reviewed    Constitutional: Non toxic appearing, no acute distress, appearing stated age  Eyes: PERRL, EOMI, No conjunctival injection  ENT: external ears normal, No rhinorrhea, external nose normal, mucous membranes moist  Cardiovascular: Regular rate and rhythm, no murmurs, No JVD  Respiratory: Clear to ausculation bilaterally, No stridor, Normal work of breathing and chest excursion bilaterally  Abdomen: Soft, non tender, non distended, normoactive bowel sounds, No rigidity, no peritoneal signs  Musculoskeletal:  No evidence of obvious injury to Head, Neck, Back, Extremities, no LE edema  Skin: Warm, dry, No obvious rashes  Neuro: Alert and oriented x 3, CN 2-12 intact, normal speech, strength and sensation full and symmetric bilaterally  Psychiatric: Normal mood and affect      Diagnostic Study Results     Labs -     Recent Results (from the past 72 hour(s))   CBC WITH AUTOMATED DIFF    Collection Time: 06/01/21 11:30 AM   Result Value Ref Range    WBC 6.1 4.6 - 13.2 K/uL    RBC 4.18 (L) 4.20 - 5.30 M/uL    HGB 12.5 12.0 - 16.0 g/dL    HCT 37.9 35.0 - 45.0 %    MCV 90.7 74.0 - 97.0 FL    MCH 29.9 24.0 - 34.0 PG    MCHC 33.0 31.0 - 37.0 g/dL    RDW 11.8 11.6 - 14.5 %    PLATELET 547 486 - 429 K/uL    MPV 9.3 9.2 - 11.8 FL    NEUTROPHILS 47 40 - 73 %    LYMPHOCYTES 39 21 - 52 % MONOCYTES 10 3 - 10 %    EOSINOPHILS 3 0 - 5 %    BASOPHILS 1 0 - 2 %    ABS. NEUTROPHILS 2.9 1.8 - 8.0 K/UL    ABS. LYMPHOCYTES 2.3 0.9 - 3.6 K/UL    ABS. MONOCYTES 0.6 0.05 - 1.2 K/UL    ABS. EOSINOPHILS 0.2 0.0 - 0.4 K/UL    ABS. BASOPHILS 0.0 0.0 - 0.1 K/UL    DF AUTOMATED     TYPE & SCREEN    Collection Time: 06/01/21 11:30 AM   Result Value Ref Range    Crossmatch Expiration 06/04/2021,2359     ABO/Rh(D) A NEGATIVE     Antibody screen NEG    METABOLIC PANEL, COMPREHENSIVE    Collection Time: 06/01/21 11:30 AM   Result Value Ref Range    Sodium 139 136 - 145 mmol/L    Potassium 3.4 (L) 3.5 - 5.5 mmol/L    Chloride 105 100 - 111 mmol/L    CO2 27 21 - 32 mmol/L    Anion gap 7 3.0 - 18 mmol/L    Glucose 101 (H) 74 - 99 mg/dL    BUN 11 7.0 - 18 MG/DL    Creatinine 0.69 0.6 - 1.3 MG/DL    BUN/Creatinine ratio 16 12 - 20      GFR est AA >60 >60 ml/min/1.73m2    GFR est non-AA >60 >60 ml/min/1.73m2    Calcium 9.0 8.5 - 10.1 MG/DL    Bilirubin, total 0.7 0.2 - 1.0 MG/DL    ALT (SGPT) 15 13 - 56 U/L    AST (SGOT) 11 10 - 38 U/L    Alk. phosphatase 41 (L) 45 - 117 U/L    Protein, total 7.6 6.4 - 8.2 g/dL    Albumin 3.9 3.4 - 5.0 g/dL    Globulin 3.7 2.0 - 4.0 g/dL    A-G Ratio 1.1 0.8 - 1.7     URINALYSIS W/ RFLX MICROSCOPIC    Collection Time: 06/01/21 11:30 AM   Result Value Ref Range    Color YELLOW      Appearance CLOUDY      Specific gravity 1.030 1.005 - 1.030      pH (UA) 6.5 5.0 - 8.0      Protein Negative NEG mg/dL    Glucose Negative NEG mg/dL    Ketone TRACE (A) NEG mg/dL    Bilirubin Negative NEG      Blood Negative NEG      Urobilinogen 1.0 0.2 - 1.0 EU/dL    Nitrites Negative NEG      Leukocyte Esterase Negative NEG     BETA HCG, QT    Collection Time: 06/01/21 11:30 AM   Result Value Ref Range    Beta HCG, QT 10,255 (H) 0 - 10 MIU/ML       Radiologic Studies -   US OB TV W DOPPLER   Final Result      1.  Sonographic findings compatible with intrauterine pregnancy failure   (crown-rump length greater than 7 mm and no fetal heartbeat). 2. Small subchorionic hemorrhage. 3. Normal blood flow to each ovary. Likely left ovarian corpus luteum. CT Results  (Last 48 hours)    None        CXR Results  (Last 48 hours)    None          Medications given in the ED-  Medications - No data to display      Medical Decision Making     I reviewed the vital signs, available nursing notes, past medical history, past surgical history, family history and social history. Vital Signs interpretation- I have reviewed the patient's vital signs. Pulse Oximetry interpretation - 100% on Room air     Cardiac Monitor interpretation:  Rate: 81 bpm  Rhythm: sinus    Records Reviewed: Nursing Notes and Old Medical Records    Procedures:  Procedures    ED Course and MDM:  Patient symptoms most likely related to her recent intimacy with her partner likely causing some friability to her cervix causing the bleeding as well as some cramping. Will obtain blood work including type and screen and quantitative beta and ultrasound for further evaluation to rule out potential for threatened miscarriage    Patient's laboratory findings concerning as her beta-hCG is only about 10,000 which is consistent with about 7 weeks of gestation. Her ultrasound did show evidence of intrauterine fetal demise measuring 7 weeks but no movement or cardiac activity. The remainder of her blood work is unremarkable. Her blood type is A- although she is not having any vaginal bleeding or passage of products at this time. She states she does see Dr. Caesar Meade as her OB/GYN. I discussed with the patient and her mother at bedside the results of her laboratory findings and imaging studies. I stated that at this time there is not any need to perform any sort of emergent interventions and that it is possible that she could still pass a natural miscarriage. I recommended she call and contact Dr. Caesar Meade today for reevaluation and follow-up.   I recommended she come back to emergency department for symptoms worsen including but not limited to severe abdominal pain or severe bleeding. She understands and agrees to plan    Diagnosis and Disposition         DISCHARGE NOTE:    Faisal Martinez  results have been reviewed with her. She has been counseled regarding her diagnosis, treatment, and plan. She verbally conveys understanding and agreement of the signs, symptoms, diagnosis, treatment and prognosis and additionally agrees to follow up as discussed. She also agrees with the care-plan and conveys that all of her questions have been answered. I have also provided discharge instructions for her that include: educational information regarding their diagnosis and treatment, and list of reasons why they would want to return to the ED prior to their follow-up appointment, should her condition change. She has been provided with education for proper emergency department utilization. CLINICAL IMPRESSION:    1. Fetal demise before 20 weeks with retention of dead fetus    2. Pelvic cramping        PLAN:  1. D/C Home  2. Current Discharge Medication List        3. Follow-up Information     Follow up With Specialties Details Why Mildred Vega MD Obstetrics & Gynecology, Gynecology, Obstetrics Call today  [address] [facility] [address]      [facility] EMERGENCY DE[facility] Emergency Medicine Go to  If symptoms worsen [address] [address]  [phone]        _______________________________      Please note that this dictation was completed with [employer], the computer voice recognition software. Quite often unanticipated grammatical, syntax, homophones, and other interpretive errors are inadvertently transcribed by the computer software. Please disregard these errors. Please excuse any errors that have escaped final proofreading.

## 2021-06-08 ENCOUNTER — HOSPITAL ENCOUNTER (OUTPATIENT)
Dept: INFUSION THERAPY | Age: 36
Discharge: HOME | End: 2021-06-08
Payer: MEDICAID

## 2021-06-08 LAB
ABO + RH BLD: NORMAL
BLOOD GROUP ANTIBODIES SERPL: NORMAL
SPECIMEN EXP DATE BLD: NORMAL

## 2021-06-08 PROCEDURE — 36415 COLL VENOUS BLD VENIPUNCTURE: CPT

## 2021-06-08 PROCEDURE — 86901 BLOOD TYPING SEROLOGIC RH(D): CPT

## 2021-06-09 ENCOUNTER — HOSPITAL ENCOUNTER (OUTPATIENT)
Dept: INFUSION THERAPY | Age: 36
Discharge: HOME | End: 2021-06-09
Payer: MEDICAID

## 2021-06-09 VITALS
DIASTOLIC BLOOD PRESSURE: 77 MMHG | RESPIRATION RATE: 18 BRPM | HEART RATE: 86 BPM | SYSTOLIC BLOOD PRESSURE: 110 MMHG | TEMPERATURE: 98.1 F | OXYGEN SATURATION: 100 %

## 2021-06-09 PROCEDURE — 74011250636 HC RX REV CODE- 250/636: Performed by: OBSTETRICS & GYNECOLOGY

## 2021-06-09 PROCEDURE — 96372 THER/PROPH/DIAG INJ SC/IM: CPT

## 2021-06-09 RX ADMIN — HUMAN RHO(D) IMMUNE GLOBULIN 0.3 MG: 300 INJECTION, SOLUTION INTRAMUSCULAR at 13:13

## 2021-06-10 LAB
BLD PROD TYP BPU: NORMAL
BPU ID: NORMAL
GA (WEEKS): 11 WK
STATUS OF UNIT,%ST: NORMAL
UNIT DIVISION, %UDIV: 0

## 2021-09-07 ENCOUNTER — HOSPITAL ENCOUNTER (OUTPATIENT)
Dept: INFUSION THERAPY | Age: 36
Discharge: HOME | End: 2021-09-07
Payer: MEDICAID

## 2021-09-07 LAB
ABO + RH BLD: NORMAL
BLOOD GROUP ANTIBODIES SERPL: NORMAL
SPECIMEN EXP DATE BLD: NORMAL

## 2021-09-07 PROCEDURE — 86901 BLOOD TYPING SEROLOGIC RH(D): CPT

## 2021-09-07 PROCEDURE — 36415 COLL VENOUS BLD VENIPUNCTURE: CPT

## 2021-09-08 ENCOUNTER — HOSPITAL ENCOUNTER (OUTPATIENT)
Dept: INFUSION THERAPY | Age: 36
Discharge: HOME | End: 2021-09-08
Payer: MEDICAID

## 2021-09-08 VITALS
DIASTOLIC BLOOD PRESSURE: 79 MMHG | OXYGEN SATURATION: 100 % | TEMPERATURE: 99.3 F | SYSTOLIC BLOOD PRESSURE: 116 MMHG | RESPIRATION RATE: 16 BRPM | HEART RATE: 82 BPM

## 2021-09-08 PROCEDURE — 74011250636 HC RX REV CODE- 250/636: Performed by: OBSTETRICS & GYNECOLOGY

## 2021-09-08 PROCEDURE — 96372 THER/PROPH/DIAG INJ SC/IM: CPT

## 2021-09-08 RX ADMIN — HUMAN RHO(D) IMMUNE GLOBULIN 0.3 MG: 300 INJECTION, SOLUTION INTRAMUSCULAR at 13:18

## 2021-09-08 NOTE — PROGRESS NOTES
[facility] Progress Note    Date: 2021    Name: Karla Merrill    MRN: [id number]         : 1985    Rhogam Injection      Ms. Barbosa to [facility], ambulatory at 1300. Pt was assessed and education was provided. Patient states she has had a miscarriage. Pt given written info about Rhogam; explained reason for giving to Rh negative mothers. Pt verbalized understanding and signed consent form. Ms. Keturah Bautista vitals were reviewed and patient was observed for 5 minutes prior to treatment. Visit Vitals  /79 (BP 1 Location: Left arm, BP Patient Position: Sitting)   Pulse 82   Temp 99.3 °F (37.4 °C)   Resp 16   SpO2 100%   Breastfeeding No       Pt's blood type is A negative, which is within ordered parameters to give Rhogam today. Rhogam 300 mcg was administered IM in R deltoid. No irritation or bleeding noted at site. Bandaid applied. Ms. Josiah Garcia tolerated well, and had no complaints. Pt observed for 30 minutes post-injection. She denied itching/hives/rash, SOB, difficulty swallowing or speaking, chest pain, any swelling or any other signs of allergic reaction. Pt given printed copy of discharge instructions; reviewed with her symptoms that require medical attention. Pt given Rhogam card with lot #, expiration date, etc.     Patient armband removed and shredded. Ms. Josiah Garcia was discharged from [place] [address] in stable condition at 1330. She is to follow-up with Dr. Lalo Sharma as instructed.     Davis Larios RN  2021  1:35 PM

## 2021-09-10 LAB
BLD PROD TYP BPU: NORMAL
BPU ID: NORMAL
GA (WEEKS): 5 WK
STATUS OF UNIT,%ST: NORMAL
UNIT DIVISION, %UDIV: 0

## 2022-03-07 ENCOUNTER — HOSPITAL ENCOUNTER (EMERGENCY)
Age: 37
Discharge: HOME | End: 2022-03-07
Attending: EMERGENCY MEDICINE
Payer: MEDICAID

## 2022-03-07 ENCOUNTER — APPOINTMENT (OUTPATIENT)
Dept: ULTRASOUND IMAGING | Age: 37
End: 2022-03-07
Attending: EMERGENCY MEDICINE
Payer: MEDICAID

## 2022-03-07 VITALS
SYSTOLIC BLOOD PRESSURE: 134 MMHG | WEIGHT: 185 LBS | RESPIRATION RATE: 18 BRPM | HEIGHT: 68 IN | DIASTOLIC BLOOD PRESSURE: 64 MMHG | TEMPERATURE: 97.5 F | OXYGEN SATURATION: 100 % | HEART RATE: 93 BPM | BODY MASS INDEX: 28.04 KG/M2

## 2022-03-07 DIAGNOSIS — N30.00 ACUTE CYSTITIS WITHOUT HEMATURIA: ICD-10-CM

## 2022-03-07 DIAGNOSIS — O26.892 PELVIC PAIN AFFECTING PREGNANCY IN SECOND TRIMESTER, ANTEPARTUM: Primary | ICD-10-CM

## 2022-03-07 DIAGNOSIS — R10.2 PELVIC PAIN AFFECTING PREGNANCY IN SECOND TRIMESTER, ANTEPARTUM: Primary | ICD-10-CM

## 2022-03-07 LAB
ABO + RH BLD: NORMAL
ALBUMIN SERPL-MCNC: 3.1 G/DL (ref 3.4–5)
ALBUMIN/GLOB SERPL: 0.7 {RATIO} (ref 0.8–1.7)
ALP SERPL-CCNC: 42 U/L (ref 45–117)
ALT SERPL-CCNC: 15 U/L (ref 13–56)
ANION GAP SERPL CALC-SCNC: 6 MMOL/L (ref 3–18)
APPEARANCE UR: ABNORMAL
AST SERPL-CCNC: 13 U/L (ref 10–38)
BACTERIA URNS QL MICRO: ABNORMAL /HPF
BASOPHILS # BLD: 0 K/UL (ref 0–0.1)
BASOPHILS NFR BLD: 1 % (ref 0–2)
BILIRUB SERPL-MCNC: 0.5 MG/DL (ref 0.2–1)
BILIRUB UR QL: NEGATIVE
BLOOD GROUP ANTIBODIES SERPL: NORMAL
BUN SERPL-MCNC: 8 MG/DL (ref 7–18)
BUN/CREAT SERPL: 15 (ref 12–20)
CALCIUM SERPL-MCNC: 9 MG/DL (ref 8.5–10.1)
CHLORIDE SERPL-SCNC: 107 MMOL/L (ref 100–111)
CO2 SERPL-SCNC: 25 MMOL/L (ref 21–32)
COLOR UR: YELLOW
CREAT SERPL-MCNC: 0.54 MG/DL (ref 0.6–1.3)
DIFFERENTIAL METHOD BLD: ABNORMAL
EOSINOPHIL # BLD: 0.1 K/UL (ref 0–0.4)
EOSINOPHIL NFR BLD: 1 % (ref 0–5)
EPITH CASTS URNS QL MICRO: ABNORMAL /LPF (ref 0–5)
ERYTHROCYTE [DISTWIDTH] IN BLOOD BY AUTOMATED COUNT: 12.6 % (ref 11.6–14.5)
GLOBULIN SER CALC-MCNC: 4.2 G/DL (ref 2–4)
GLUCOSE SERPL-MCNC: 93 MG/DL (ref 74–99)
GLUCOSE UR STRIP.AUTO-MCNC: NEGATIVE MG/DL
HCG SERPL-ACNC: ABNORMAL MIU/ML (ref 0–10)
HCG UR QL: POSITIVE
HCT VFR BLD AUTO: 32.2 % (ref 35–45)
HGB BLD-MCNC: 10.9 G/DL (ref 12–16)
HGB UR QL STRIP: NEGATIVE
IMM GRANULOCYTES # BLD AUTO: 0.1 K/UL (ref 0–0.04)
IMM GRANULOCYTES NFR BLD AUTO: 1 % (ref 0–0.5)
KETONES UR QL STRIP.AUTO: 40 MG/DL
LEUKOCYTE ESTERASE UR QL STRIP.AUTO: ABNORMAL
LIPASE SERPL-CCNC: 87 U/L (ref 73–393)
LYMPHOCYTES # BLD: 1.9 K/UL (ref 0.9–3.6)
LYMPHOCYTES NFR BLD: 25 % (ref 21–52)
MCH RBC QN AUTO: 29.7 PG (ref 24–34)
MCHC RBC AUTO-ENTMCNC: 33.9 G/DL (ref 31–37)
MCV RBC AUTO: 87.7 FL (ref 78–100)
MONOCYTES # BLD: 0.7 K/UL (ref 0.05–1.2)
MONOCYTES NFR BLD: 10 % (ref 3–10)
NEUTS SEG # BLD: 4.7 K/UL (ref 1.8–8)
NEUTS SEG NFR BLD: 63 % (ref 40–73)
NITRITE UR QL STRIP.AUTO: NEGATIVE
NRBC # BLD: 0 K/UL (ref 0–0.01)
NRBC BLD-RTO: 0 PER 100 WBC
PH UR STRIP: 7.5 [PH] (ref 5–8)
PLATELET # BLD AUTO: 314 K/UL (ref 135–420)
PMV BLD AUTO: 9.2 FL (ref 9.2–11.8)
POTASSIUM SERPL-SCNC: 3.6 MMOL/L (ref 3.5–5.5)
PROT SERPL-MCNC: 7.3 G/DL (ref 6.4–8.2)
PROT UR STRIP-MCNC: ABNORMAL MG/DL
RBC # BLD AUTO: 3.67 M/UL (ref 4.2–5.3)
RBC #/AREA URNS HPF: ABNORMAL /HPF (ref 0–5)
SODIUM SERPL-SCNC: 138 MMOL/L (ref 136–145)
SP GR UR REFRACTOMETRY: 1.02 (ref 1–1.03)
SPECIMEN EXP DATE BLD: NORMAL
UROBILINOGEN UR QL STRIP.AUTO: 1 EU/DL (ref 0.2–1)
WBC # BLD AUTO: 7.5 K/UL (ref 4.6–13.2)
WBC URNS QL MICRO: ABNORMAL /HPF (ref 0–5)

## 2022-03-07 PROCEDURE — 76805 OB US >/= 14 WKS SNGL FETUS: CPT

## 2022-03-07 PROCEDURE — 83690 ASSAY OF LIPASE: CPT

## 2022-03-07 PROCEDURE — 87086 URINE CULTURE/COLONY COUNT: CPT

## 2022-03-07 PROCEDURE — 80053 COMPREHEN METABOLIC PANEL: CPT

## 2022-03-07 PROCEDURE — 99284 EMERGENCY DEPT VISIT MOD MDM: CPT

## 2022-03-07 PROCEDURE — 85025 COMPLETE CBC W/AUTO DIFF WBC: CPT

## 2022-03-07 PROCEDURE — 81001 URINALYSIS AUTO W/SCOPE: CPT

## 2022-03-07 PROCEDURE — 86900 BLOOD TYPING SEROLOGIC ABO: CPT

## 2022-03-07 PROCEDURE — 84702 CHORIONIC GONADOTROPIN TEST: CPT

## 2022-03-07 PROCEDURE — 81025 URINE PREGNANCY TEST: CPT

## 2022-03-07 RX ORDER — CEPHALEXIN 500 MG/1
500 CAPSULE ORAL 4 TIMES DAILY
Qty: 28 CAPSULE | Refills: 0 | Status: SHIPPED | OUTPATIENT
Start: 2022-03-07 | End: 2022-03-07 | Stop reason: SDUPTHER

## 2022-03-07 RX ORDER — CEPHALEXIN 500 MG/1
500 CAPSULE ORAL 4 TIMES DAILY
Qty: 28 CAPSULE | Refills: 0 | Status: SHIPPED | OUTPATIENT
Start: 2022-03-07 | End: 2022-03-14

## 2022-03-07 NOTE — ED PROVIDER NOTES
58-year-old female at approximately 12 weeks by date she is a [id number] who presents emergency department complaining of 1 week of pelvic pressure and fullness. She denies any vaginal bleeding or discharge. Arrives emergency department no acute distress speaking complete sentences afebrile not tachycardic not hypotensive           Past Medical History:   Diagnosis Date    Anemia     Asthma     Genital herpes, unspecified     no outbreaks, dx by blood test    Rhesus isoimmunization unspecified as to episode of care in pregnancy        No past surgical history on file. Family History:   Problem Relation Age of Onset    Diabetes Maternal Grandmother     Diabetes Maternal Grandfather        Social History     Socioeconomic History    Marital status: SINGLE     Spouse name: Not on file    Number of children: Not on file    Years of education: Not on file    Highest education level: Not on file   Occupational History    Not on file   Tobacco Use    Smoking status: Never Smoker    Smokeless tobacco: Never Used   Substance and Sexual Activity    Alcohol use: No    Drug use: No    Sexual activity: Yes     Partners: Male     Birth control/protection: Pill   Other Topics Concern    Not on file   Social History Narrative    Not on file     Social Determinants of Health     Financial Resource Strain:     Difficulty of Paying Living Expenses: Not on file   Food Insecurity:     Worried About Running Out of Food in the Last Year: Not on file    Phu of Food in the Last Year: Not on file   Transportation Needs:     Lack of Transportation (Medical): Not on file    Lack of Transportation (Non-Medical):  Not on file   Physical Activity:     Days of Exercise per Week: Not on file    Minutes of Exercise per Session: Not on file   Stress:     Feeling of Stress : Not on file   Social Connections:     Frequency of Communication with Friends and Family: Not on file    Frequency of Social Gatherings with Friends and Family: Not on file    Attends Adventism Services: Not on file    Active Member of Clubs or Organizations: Not on file    Attends Club or Organization Meetings: Not on file    Marital Status: Not on file   Intimate Partner Violence:     Fear of Current or Ex-Partner: Not on file    Emotionally Abused: Not on file    Physically Abused: Not on file    Sexually Abused: Not on file   Housing Stability:     Unable to Pay for Housing in the Last Year: Not on file    Number of [place] in the Last Year: Not on file    Unstable Housing in the Last Year: Not on file         ALLERGIES: Latex    Review of Systems   Constitutional: Negative. HENT: Negative. Respiratory: Negative. Cardiovascular: Negative. Gastrointestinal: Positive for abdominal pain, diarrhea, nausea and vomiting. Negative for anal bleeding, blood in stool, constipation and rectal pain. Endocrine: Negative. Genitourinary: Negative. Musculoskeletal: Negative. Skin: Negative. Neurological: Negative. Hematological: Negative. Psychiatric/Behavioral: Negative. Vitals:    03/07/22 1022   BP: 134/64   Pulse: 93   Resp: 18   Temp: 97.5 °F (36.4 °C)   SpO2: 100%   Weight: 83.9 kg (185 lb)   Height: 5' 8\" (1.727 m)            Physical Exam  Vitals and nursing note reviewed. Constitutional:       General: She is not in acute distress. Appearance: Normal appearance. She is normal weight. She is not ill-appearing, toxic-appearing or diaphoretic. HENT:      Head: Normocephalic and atraumatic. Nose: Nose normal. No congestion or rhinorrhea. Mouth/Throat:      Mouth: Mucous membranes are moist.      Pharynx: Oropharynx is clear. Eyes:      General:         Right eye: No discharge. Left eye: No discharge. Conjunctiva/sclera: Conjunctivae normal.      Pupils: Pupils are equal, round, and reactive to light. Cardiovascular:      Rate and Rhythm: Normal rate and regular rhythm. Pulses: Normal pulses. Heart sounds: No murmur heard. No friction rub. No gallop. Pulmonary:      Effort: Pulmonary effort is normal. No respiratory distress. Breath sounds: Normal breath sounds. No stridor. No wheezing or rhonchi. Abdominal:      General: Abdomen is flat. Bowel sounds are normal. There is no distension. Palpations: Abdomen is soft. There is no mass. Tenderness: There is no abdominal tenderness. Hernia: No hernia is present. Musculoskeletal:         General: No swelling or tenderness. Normal range of motion. Skin:     General: Skin is warm and dry. Capillary Refill: Capillary refill takes less than 2 seconds. Coloration: Skin is not jaundiced. Neurological:      General: No focal deficit present. Mental Status: She is alert and oriented to person, place, and time. Mental status is at baseline. Psychiatric:         Behavior: Behavior normal.         Thought Content: Thought content normal.          MDM  Number of Diagnoses or Management Options  Acute cystitis without hematuria  Pelvic pain affecting pregnancy in second trimester, antepartum  Diagnosis management comments: 58-year-old female presents emergency department with complaint of 1 week of pelvic pressure patient is 16 weeks by dates, has had no vaginal bleeding or discharge. States that when she walks around to use the bathroom feels like there is a lot of pressure in her vaginal area coming from the suprapubic area. On exam lungs are clear to auscultation bilaterally heart sounds are unremarkable. Patient has a gravid uterus some suprapubic tenderness. IV was started labs were sent. CBC shows mild anemia as expected BMP shows no acute emergent electrolyte abnormalities. Urine is overtly infected. This is likely the source of the pain and pressure patient is describing.   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 Pain and symptoms largely explained by cystitis.   Patient given prescription for Keflex discharged home with outpatient follow-up         Procedures

## 2022-03-07 NOTE — ED NOTES
Pt arrived ambulatory to triage with c/o vaginal pain and pressure  Pt is 4 months pregnant, has a past hx of 3 miscarriages  Denies any bleeding, states this does not feel like her past miscarriages  Pt has had 4 vaginal births at full term

## 2022-03-08 LAB
BACTERIA SPEC CULT: NORMAL
SERVICE CMNT-IMP: NORMAL

## 2022-03-18 PROBLEM — O02.1 FETAL DEMISE BEFORE 20 WEEKS WITH RETENTION OF DEAD FETUS: Status: ACTIVE | Noted: 2021-06-01

## 2022-03-18 PROBLEM — R10.2 PELVIC CRAMPING: Status: ACTIVE | Noted: 2021-06-01

## 2022-06-01 ENCOUNTER — HOSPITAL ENCOUNTER (OUTPATIENT)
Dept: INFUSION THERAPY | Age: 37
Discharge: HOME | End: 2022-06-01
Payer: MEDICAID

## 2022-06-01 ENCOUNTER — APPOINTMENT (OUTPATIENT)
Dept: INFUSION THERAPY | Age: 37
End: 2022-06-01
Payer: MEDICAID

## 2022-06-01 LAB
ABO + RH BLD: NORMAL
BLOOD GROUP ANTIBODIES SERPL: NORMAL
SPECIMEN EXP DATE BLD: NORMAL

## 2022-06-01 PROCEDURE — 36415 COLL VENOUS BLD VENIPUNCTURE: CPT

## 2022-06-01 PROCEDURE — 86900 BLOOD TYPING SEROLOGIC ABO: CPT

## 2022-06-02 ENCOUNTER — HOSPITAL ENCOUNTER (OUTPATIENT)
Dept: INFUSION THERAPY | Age: 37
End: 2022-06-02
Payer: MEDICAID

## 2022-06-02 ENCOUNTER — HOSPITAL ENCOUNTER (OUTPATIENT)
Dept: INFUSION THERAPY | Age: 37
Discharge: HOME | End: 2022-06-02
Payer: MEDICAID

## 2022-06-02 VITALS
DIASTOLIC BLOOD PRESSURE: 68 MMHG | RESPIRATION RATE: 16 BRPM | HEART RATE: 84 BPM | OXYGEN SATURATION: 100 % | SYSTOLIC BLOOD PRESSURE: 110 MMHG | TEMPERATURE: 98.1 F

## 2022-06-02 PROCEDURE — 96372 THER/PROPH/DIAG INJ SC/IM: CPT

## 2022-06-02 PROCEDURE — 74011250636 HC RX REV CODE- 250/636: Performed by: OBSTETRICS & GYNECOLOGY

## 2022-06-02 RX ADMIN — HUMAN RHO(D) IMMUNE GLOBULIN 0.3 MG: 300 INJECTION, SOLUTION INTRAMUSCULAR at 14:37

## 2022-06-02 NOTE — PROGRESS NOTES
[facility] Progress Note    Date: 2022    Name: Karyle Messenger    MRN: [id number]         : 1985    Rhogam Injection       Ms. Barbosa to [facility], ambulatory at 1425. Pt was assessed and education was provided. Pt has received \" multiple Rhogams in the past\" and had no reactions. Reviewed consent form   Pt verbalized understanding and signed consent form. Ms. Yamil Krueger vitals were reviewed and patient was observed for 5 minutes prior to treatment. Visit Vitals  /68 (BP 1 Location: Left arm, BP Patient Position: Sitting)   Pulse 84   Temp 98.1 °F (36.7 °C)   Resp 16   SpO2 100%   Breastfeeding No       Pt's blood type is A negative, which is within ordered parameters to give Rhogam today. Rhogam 300 mcg was administered IM in left deltoid. No irritation or bleeding noted at site. Bandaid applied. Ms. Keyona Lugo tolerated well, and had no complaints. Pt kindly declined observation  for 30 minutes post-injection. She denied itching/hives/rash, SOB, difficulty swallowing or speaking, chest pain, any swelling or any other signs of allergic reaction. Patient armband removed and shredded. Ms. Keyona Lugo was discharged from [place] [address] in stable condition at 1500.  She is to follow-up as referred     Italia Guerra RN  2022

## 2022-06-03 LAB
BLD PROD TYP BPU: NORMAL
BPU ID: NORMAL
GA (WEEKS): 28 WK
STATUS OF UNIT,%ST: NORMAL
UNIT DIVISION, %UDIV: 0

## 2023-05-24 ENCOUNTER — HOSPITAL ENCOUNTER (EMERGENCY)
Facility: HOSPITAL | Age: 38
Discharge: HOME | End: 2023-05-24
Attending: EMERGENCY MEDICINE
Payer: MEDICAID

## 2023-05-24 ENCOUNTER — APPOINTMENT (OUTPATIENT)
Facility: HOSPITAL | Age: 38
End: 2023-05-24
Payer: MEDICAID

## 2023-05-24 VITALS
BODY MASS INDEX: 28.04 KG/M2 | WEIGHT: 185 LBS | HEART RATE: 94 BPM | HEIGHT: 68 IN | OXYGEN SATURATION: 100 % | TEMPERATURE: 98.4 F | RESPIRATION RATE: 20 BRPM | SYSTOLIC BLOOD PRESSURE: 140 MMHG | DIASTOLIC BLOOD PRESSURE: 81 MMHG

## 2023-05-24 DIAGNOSIS — F41.9 ANXIETY: ICD-10-CM

## 2023-05-24 DIAGNOSIS — E04.1 THYROID NODULE: Primary | ICD-10-CM

## 2023-05-24 DIAGNOSIS — E04.1 THYROID CYST: ICD-10-CM

## 2023-05-24 DIAGNOSIS — E07.9 MASS OF THYROID GLAND: ICD-10-CM

## 2023-05-24 LAB
ALBUMIN SERPL-MCNC: 4.1 G/DL (ref 3.4–5)
ALBUMIN/GLOB SERPL: 1.2 (ref 0.8–1.7)
ALP SERPL-CCNC: 55 U/L (ref 45–117)
ALT SERPL-CCNC: 18 U/L (ref 13–56)
ANION GAP SERPL CALC-SCNC: 7 MMOL/L (ref 3–18)
AST SERPL-CCNC: 16 U/L (ref 10–38)
BASOPHILS # BLD: 0 K/UL (ref 0–0.1)
BASOPHILS NFR BLD: 1 % (ref 0–2)
BILIRUB SERPL-MCNC: 0.7 MG/DL (ref 0.2–1)
BUN SERPL-MCNC: 13 MG/DL (ref 7–18)
BUN/CREAT SERPL: 15 (ref 12–20)
CALCIUM SERPL-MCNC: 9.2 MG/DL (ref 8.5–10.1)
CHLORIDE SERPL-SCNC: 106 MMOL/L (ref 100–111)
CO2 SERPL-SCNC: 28 MMOL/L (ref 21–32)
CREAT SERPL-MCNC: 0.88 MG/DL (ref 0.6–1.3)
DIFFERENTIAL METHOD BLD: NORMAL
EOSINOPHIL # BLD: 0.1 K/UL (ref 0–0.4)
EOSINOPHIL NFR BLD: 1 % (ref 0–5)
ERYTHROCYTE [DISTWIDTH] IN BLOOD BY AUTOMATED COUNT: 11.6 % (ref 11.6–14.5)
GLOBULIN SER CALC-MCNC: 3.5 G/DL (ref 2–4)
GLUCOSE SERPL-MCNC: 99 MG/DL (ref 74–99)
HCG SERPL QL: NEGATIVE
HCT VFR BLD AUTO: 38.4 % (ref 35–45)
HGB BLD-MCNC: 12.8 G/DL (ref 12–16)
IMM GRANULOCYTES # BLD AUTO: 0 K/UL (ref 0–0.04)
IMM GRANULOCYTES NFR BLD AUTO: 0 % (ref 0–0.5)
LYMPHOCYTES # BLD: 1.9 K/UL (ref 0.9–3.6)
LYMPHOCYTES NFR BLD: 30 % (ref 21–52)
MCH RBC QN AUTO: 29.5 PG (ref 24–34)
MCHC RBC AUTO-ENTMCNC: 33.3 G/DL (ref 31–37)
MCV RBC AUTO: 88.5 FL (ref 78–100)
MONOCYTES # BLD: 0.6 K/UL (ref 0.05–1.2)
MONOCYTES NFR BLD: 9 % (ref 3–10)
NEUTS SEG # BLD: 3.8 K/UL (ref 1.8–8)
NEUTS SEG NFR BLD: 59 % (ref 40–73)
NRBC # BLD: 0 K/UL (ref 0–0.01)
NRBC BLD-RTO: 0 PER 100 WBC
PLATELET # BLD AUTO: 351 K/UL (ref 135–420)
PMV BLD AUTO: 9.2 FL (ref 9.2–11.8)
POTASSIUM SERPL-SCNC: 4 MMOL/L (ref 3.5–5.5)
PROT SERPL-MCNC: 7.6 G/DL (ref 6.4–8.2)
RBC # BLD AUTO: 4.34 M/UL (ref 4.2–5.3)
S PYO AG THROAT QL: NEGATIVE
SODIUM SERPL-SCNC: 141 MMOL/L (ref 136–145)
T3FREE SERPL-MCNC: 2.8 PG/ML (ref 2.18–3.98)
T4 FREE SERPL-MCNC: 1.2 NG/DL (ref 0.7–1.5)
TSH SERPL DL<=0.05 MIU/L-ACNC: 0.68 UIU/ML (ref 0.36–3.74)
WBC # BLD AUTO: 6.4 K/UL (ref 4.6–13.2)

## 2023-05-24 PROCEDURE — 87880 STREP A ASSAY W/OPTIC: CPT

## 2023-05-24 PROCEDURE — 84703 CHORIONIC GONADOTROPIN ASSAY: CPT

## 2023-05-24 PROCEDURE — 84439 ASSAY OF FREE THYROXINE: CPT

## 2023-05-24 PROCEDURE — 87070 CULTURE OTHR SPECIMN AEROBIC: CPT

## 2023-05-24 PROCEDURE — 84443 ASSAY THYROID STIM HORMONE: CPT

## 2023-05-24 PROCEDURE — 84481 FREE ASSAY (FT-3): CPT

## 2023-05-24 PROCEDURE — 99285 EMERGENCY DEPT VISIT HI MDM: CPT

## 2023-05-24 PROCEDURE — 80053 COMPREHEN METABOLIC PANEL: CPT

## 2023-05-24 PROCEDURE — 76536 US EXAM OF HEAD AND NECK: CPT

## 2023-05-24 PROCEDURE — 85025 COMPLETE CBC W/AUTO DIFF WBC: CPT

## 2023-05-24 PROCEDURE — 6360000004 HC RX CONTRAST MEDICATION: Performed by: NURSE PRACTITIONER

## 2023-05-24 PROCEDURE — 70491 CT SOFT TISSUE NECK W/DYE: CPT

## 2023-05-24 RX ADMIN — IOPAMIDOL 100 ML: 612 INJECTION, SOLUTION INTRAVENOUS at 16:19

## 2023-05-24 ASSESSMENT — PAIN SCALES - GENERAL: PAINLEVEL_OUTOF10: 8

## 2023-05-24 ASSESSMENT — PAIN - FUNCTIONAL ASSESSMENT: PAIN_FUNCTIONAL_ASSESSMENT: 0-10

## 2023-05-24 NOTE — ED PROVIDER NOTES
Pt's niece called stating pt is scheduled for surgery tomorrow 10-25-18. Pt has discussed 2 different surgery with dr. Kecia Yeung and dr Dominik Hughes. What surgery will be done tomorrow. Call transferred to the rn. [facility] EMERGENCY DEPT  EMERGENCY DEPARTMENT ENCOUNTER       Pt Name: Zahra Godfrey  MRN: [id number]  [place] 1985  Date of evaluation: 5/24/2023  PCP: Kulwant Sofia MD  Note Started: 7:37 PM 5/24/23     CHIEF COMPLAINT       Chief Complaint   Patient presents with    Dental Problem    Jaw Pain    Otalgia        HISTORY OF PRESENT ILLNESS: 1 or more elements      History From: Patient  HPI Limitations: None  Chronic Conditions: Anxiety  Social Determinants affecting Dx or Tx: None     Zahra Godfrey is a 44 y.o. female who presents to ED c/o pain that radiates along right lower jaw back of right ear and down to right lateral neck as well as swelling to right frontal neck x3 days. Patient denies headache, measured fever, dysphagia, sore throat, cough, shortness of breath, abdominal pain, nausea/vomiting, chest pain, rash. Patient states she believes the swelling may have gone down slightly, states she is up-to-date on all vaccinations. Patient reports feeling anxious, states she has a history of anxiety and has been prescribed medication by her PCM but has not began taking it yet. Nursing Notes were all reviewed and agreed with or any disagreements were addressed in the HPI. PAST HISTORY     Past Medical History:  Past Medical History:   Diagnosis Date    Anemia     Asthma     Genital herpes, unspecified     no outbreaks, dx by blood test    Rhesus isoimmunization unspecified as to episode of care in pregnancy        Past Surgical History:  History reviewed. No pertinent surgical history.     Family History:  Family History   Problem Relation Age of Onset    Diabetes Maternal Grandfather     Diabetes Maternal Grandmother        Social History:  Social History     Socioeconomic History    Marital status: Single     Spouse name: None    Number of children: None    Years of education: None    Highest education level: None   Tobacco Use    Smoking status: Never    Smokeless tobacco:

## 2023-05-24 NOTE — DISCHARGE INSTRUCTIONS
Begin previously prescribed medication for anxiety  Call PCM to schedule follow-up appointment  Follow-up with dentist to assessed for possible dental infection  Return to care for new or worsening symptoms to include fever/chills, worsening pain, inability to swallow, breathing difficulty, chest pain or other concerning symptoms

## 2023-05-24 NOTE — ED TRIAGE NOTES
Patient reports \"right sided dental, jaw and neck swelling. \" Patient is alert and oriented x4, respirations are WNL, NAD noted at triage

## 2023-05-26 LAB
BACTERIA SPEC CULT: NORMAL
SERVICE CMNT-IMP: NORMAL

## 2023-06-03 ENCOUNTER — HOSPITAL ENCOUNTER (EMERGENCY)
Facility: HOSPITAL | Age: 38
Discharge: HOME | End: 2023-06-03
Attending: EMERGENCY MEDICINE
Payer: MEDICAID

## 2023-06-03 VITALS
RESPIRATION RATE: 18 BRPM | BODY MASS INDEX: 27.43 KG/M2 | TEMPERATURE: 97.3 F | HEART RATE: 106 BPM | OXYGEN SATURATION: 100 % | WEIGHT: 181 LBS | SYSTOLIC BLOOD PRESSURE: 145 MMHG | HEIGHT: 68 IN | DIASTOLIC BLOOD PRESSURE: 74 MMHG

## 2023-06-03 DIAGNOSIS — E04.1 THYROID CYST: Primary | ICD-10-CM

## 2023-06-03 PROCEDURE — 99282 EMERGENCY DEPT VISIT SF MDM: CPT

## 2023-06-03 ASSESSMENT — PAIN - FUNCTIONAL ASSESSMENT: PAIN_FUNCTIONAL_ASSESSMENT: NONE - DENIES PAIN

## 2023-06-03 NOTE — ED PROVIDER NOTES
[facility] EMERGENCY DEPT  EMERGENCY DEPARTMENT ENCOUNTER    Patient Name: Guevara Thao  MRN: [id number]  YOB: 1985  Provider: Sol Menendez MD  PCP: Nino Barber MD   Time/Date of evaluation: 7:28 PM EDT on 6/3/23    History of Presenting Illness     Chief Complaint   Patient presents with    Neck Pain     Pt has a thyroid cyst and it's beginning to feel like it's choking her. States \"feels like a thumb is being pushed into neck. \"       History Provided By: patient     History Van Aggarwal):   Guevara Thao is a 44 y.o. female who presents to the emergency department complaining of thyroid cyst.  Patient was seen here on May 24 with the same issue. At that time she underwent thyroid ultrasound and CT of the neck, results below. It appears that she has several cysts in the right lobe of the thyroid. She was instructed to follow-up with ENT. She has had some difficulty coordinating follow-up. She has begun to become anxious about it and is worried that it will continue to grow and choker. She has no difficulty breathing during my initial evaluation. She has no fevers or night sweats. No tachycardia. No weight loss. No family history of thyroid disease. Interpretation per the Radiologist below, if available at the time of this note:  [address] [place] [address] SOFT TISSUE THYROID   Final Result   Thyroid nodules as described. Based on ACR TI-RADS criteria, the   largest nodule is TR2 and not suspicious. No FNA is indicated. The smaller   isoechoic nodules are TR3 and do not meet criteria for FNA or follow-up. CT SOFT TISSUE NECK W CONTRAST   Final Result   There is a 3 x 2.4 x 3.3 cm low-density lesion in the right thyroid   corresponding to the palpable abnormality which may represent a cystic lesion   and can be further assessed with ultrasound. .            Nursing Notes were all reviewed and agreed with or any disagreements were addressed in the HPI.     Past History

## 2023-06-03 NOTE — DISCHARGE INSTRUCTIONS
You were seen today with swelling in your neck due to a large thyroid cyst. At this time, your exam is reassuring with no signs of airway compromise. Please follow up with the ENT doctor.

## 2023-12-31 ENCOUNTER — HOSPITAL ENCOUNTER (EMERGENCY)
Facility: HOSPITAL | Age: 38
Discharge: HOME | End: 2023-12-31
Payer: MEDICAID

## 2023-12-31 VITALS
DIASTOLIC BLOOD PRESSURE: 82 MMHG | WEIGHT: 165 LBS | HEART RATE: 97 BPM | SYSTOLIC BLOOD PRESSURE: 139 MMHG | BODY MASS INDEX: 25.01 KG/M2 | TEMPERATURE: 97.3 F | OXYGEN SATURATION: 100 % | RESPIRATION RATE: 18 BRPM | HEIGHT: 68 IN

## 2023-12-31 DIAGNOSIS — J02.9 ACUTE PHARYNGITIS, UNSPECIFIED ETIOLOGY: Primary | ICD-10-CM

## 2023-12-31 LAB — S PYO AG THROAT QL: NEGATIVE

## 2023-12-31 PROCEDURE — 6370000000 HC RX 637 (ALT 250 FOR IP): Performed by: PHYSICIAN ASSISTANT

## 2023-12-31 PROCEDURE — 99283 EMERGENCY DEPT VISIT LOW MDM: CPT

## 2023-12-31 PROCEDURE — 6360000002 HC RX W HCPCS: Performed by: PHYSICIAN ASSISTANT

## 2023-12-31 PROCEDURE — 87070 CULTURE OTHR SPECIMN AEROBIC: CPT

## 2023-12-31 PROCEDURE — 87147 CULTURE TYPE IMMUNOLOGIC: CPT

## 2023-12-31 PROCEDURE — 87880 STREP A ASSAY W/OPTIC: CPT

## 2023-12-31 RX ORDER — AMOXICILLIN 250 MG/1
500 CAPSULE ORAL ONCE
Status: COMPLETED | OUTPATIENT
Start: 2023-12-31 | End: 2023-12-31

## 2023-12-31 RX ORDER — METHYLPREDNISOLONE 4 MG/1
TABLET ORAL
Qty: 1 KIT | Refills: 0 | Status: SHIPPED | OUTPATIENT
Start: 2023-12-31

## 2023-12-31 RX ORDER — VENLAFAXINE 25 MG/1
25 TABLET ORAL 3 TIMES DAILY
COMMUNITY

## 2023-12-31 RX ORDER — DEXAMETHASONE 4 MG/1
10 TABLET ORAL ONCE
Status: COMPLETED | OUTPATIENT
Start: 2023-12-31 | End: 2023-12-31

## 2023-12-31 RX ORDER — ONDANSETRON 4 MG/1
4 TABLET, ORALLY DISINTEGRATING ORAL
Status: COMPLETED | OUTPATIENT
Start: 2023-12-31 | End: 2023-12-31

## 2023-12-31 RX ORDER — AMOXICILLIN 500 MG/1
500 CAPSULE ORAL 2 TIMES DAILY
Qty: 20 CAPSULE | Refills: 0 | Status: SHIPPED | OUTPATIENT
Start: 2023-12-31 | End: 2024-01-10

## 2023-12-31 RX ADMIN — AMOXICILLIN 500 MG: 250 CAPSULE ORAL at 00:57

## 2023-12-31 RX ADMIN — ONDANSETRON 4 MG: 4 TABLET, ORALLY DISINTEGRATING ORAL at 01:00

## 2023-12-31 RX ADMIN — DEXAMETHASONE 10 MG: 4 TABLET ORAL at 00:59

## 2023-12-31 ASSESSMENT — PAIN SCALES - GENERAL: PAINLEVEL_OUTOF10: 10

## 2023-12-31 NOTE — ED NOTES
Pt refuses Covid/Flu swab at this time states \"I'll do it if I absolutely have to\", pt requested result of strep A prior to obtaining nasal swab, primary RN notified

## 2023-12-31 NOTE — ED TRIAGE NOTES
Pt reports to ed with complaint of sore throat for one week, pt reports pain as uncontrolled at 10 out of 10, pt states \"it feel like I'm swallowing glass\", pt vocally WDL, swelling noted to throat, pt denies interventions prior to arrival, pt reports difficulty swallowing, pt AOX4 independent in room, pt reports daughter was seen and treated for influenza recently

## 2023-12-31 NOTE — ED PROVIDER NOTES
time. Pt expressed understanding and she agrees with plan.        FINAL IMPRESSION     1. Acute pharyngitis, unspecified etiology            DISPOSITION/PLAN   DISPOSITION Decision To Discharge 12/31/2023 12:48:00 AM           PATIENT REFERRED TO:  Shara Diggs MD  [address]  [place] [address]  [phone]          [facility] EMERGENCY DEPT  [address]  [place] [address]  [phone]             DISCHARGE MEDICATIONS:     Medication List        START taking these medications      amoxicillin 500 MG capsule  Commonly known as: AMOXIL  Take 1 capsule by mouth 2 times daily for 10 days     methylPREDNISolone 4 MG tablet  Commonly known as: MEDROL DOSEPACK  Take with food.            ASK your doctor about these medications      venlafaxine 25 MG tablet  Commonly known as: EFFEXOR               Where to Get Your Medications        These medications were sent to [facility]/pharmacy #[id number] - [place], [place] - [address] - P [phone] - F [phone] [address] [address]      Phone: [phone]   amoxicillin 500 MG capsule  methylPREDNISolone 4 MG tablet                I am the Primary Clinician of Record.       (Please note that parts of this dictation were completed with voice recognition software. Quite often unanticipated grammatical, syntax, homophones, and other interpretive errors are inadvertently transcribed by the computer software. Please disregards these errors. Please excuse any errors that have escaped final proofreading.)      Cheryl Easton PA-C  12/31/23 0149

## 2024-01-03 LAB
BACTERIA SPEC CULT: ABNORMAL
BACTERIA SPEC CULT: ABNORMAL
SERVICE CMNT-IMP: ABNORMAL

## 2024-08-19 ENCOUNTER — HOSPITAL ENCOUNTER (OUTPATIENT)
Facility: HOSPITAL | Age: 39
Discharge: HOME | End: 2024-08-22

## 2024-08-19 VITALS — BODY MASS INDEX: 25.46 KG/M2 | HEIGHT: 68 IN | WEIGHT: 168 LBS

## 2024-08-19 DIAGNOSIS — Z01.818 PRE-OP TESTING: Primary | ICD-10-CM

## 2024-08-19 LAB — LABCORP SPECIMEN COLLECTION: NORMAL

## 2024-08-19 NOTE — PERIOP NOTE
Patient denies having cardiovascular, pulmonary, diabetes, liver, kidney, or autoimmune diseases.  Not on Lithium or Digoxin.  Blood specimen for CBC w/diff sent to lab.

## 2024-09-02 ENCOUNTER — ANESTHESIA EVENT (OUTPATIENT)
Facility: HOSPITAL | Age: 39
End: 2024-09-02
Payer: MEDICAID

## 2024-09-02 ASSESSMENT — LIFESTYLE VARIABLES: SMOKING_STATUS: 0

## 2024-09-02 NOTE — ANESTHESIA PRE PROCEDURE
Department of Anesthesiology  Preprocedure Note       Name:  Naima Hickman   Age:  39 y.o.  :  1985                                          MRN:  [id number]         Date:  2024      Surgeon: Surgeon(s):  Haja Rizo DO    Procedure: Procedure(s):  TOTAL THYROIDECTOMY OP 23    Medications prior to admission:   Prior to Admission medications    Medication Sig Start Date End Date Taking? Authorizing Provider   albuterol sulfate HFA (VENTOLIN HFA) 108 (90 Base) MCG/ACT inhaler Inhale 2 puffs into the lungs every 6 hours as needed for Wheezing    ProviderViktoriya MD   Aspirin-Salicylamide-Caffeine (BC HEADACHE POWDER PO) Take by mouth    Viktoriya Salinas MD   venlafaxine (EFFEXOR) 25 MG tablet Take 1 tablet by mouth 3 times daily    ProviderViktoriya MD       Current medications:    No current facility-administered medications for this encounter.     Current Outpatient Medications   Medication Sig Dispense Refill    albuterol sulfate HFA (VENTOLIN HFA) 108 (90 Base) MCG/ACT inhaler Inhale 2 puffs into the lungs every 6 hours as needed for Wheezing      Aspirin-Salicylamide-Caffeine (BC HEADACHE POWDER PO) Take by mouth      venlafaxine (EFFEXOR) 25 MG tablet Take 1 tablet by mouth 3 times daily         Allergies:    Allergies   Allergen Reactions    Latex Rash    Cocamidopropyl Betaine Rash    Nickel Rash       Problem List:    Patient Active Problem List   Diagnosis Code    Fetal demise before 20 weeks with retention of dead fetus O02.1    Pelvic cramping R10.2       Past Medical History:        Diagnosis Date    Anemia     Asthma     Depression     Genital herpes, unspecified     no outbreaks, dx by blood test    Goiter     3 nodules- Dr Watts    Migraines     Rhesus isoimmunization unspecified as to episode of care in pregnancy     Sleep apnea     no cpap       Past Surgical History:  History reviewed. No pertinent surgical history.    Social History:    Social History

## 2024-09-03 ENCOUNTER — HOSPITAL ENCOUNTER (OUTPATIENT)
Facility: HOSPITAL | Age: 39
Setting detail: OBSERVATION
Discharge: HOME | End: 2024-09-04
Attending: OTOLARYNGOLOGY | Admitting: OTOLARYNGOLOGY
Payer: MEDICAID

## 2024-09-03 ENCOUNTER — ANESTHESIA (OUTPATIENT)
Facility: HOSPITAL | Age: 39
End: 2024-09-03
Payer: MEDICAID

## 2024-09-03 PROBLEM — E04.9 GOITER: Status: ACTIVE | Noted: 2024-09-03

## 2024-09-03 LAB
CA-I SERPL-SCNC: 1.1 MMOL/L (ref 1.12–1.32)
HCG UR QL: NEGATIVE
PTH-INTACT P EXCISION SERPL-MCNC: 52.1 PG/ML (ref 8–74)
SPECIMEN DRAWN SERPL: 1000

## 2024-09-03 PROCEDURE — 2580000003 HC RX 258: Performed by: OTOLARYNGOLOGY

## 2024-09-03 PROCEDURE — 6360000002 HC RX W HCPCS: Performed by: OTOLARYNGOLOGY

## 2024-09-03 PROCEDURE — 7100000000 HC PACU RECOVERY - FIRST 15 MIN: Performed by: OTOLARYNGOLOGY

## 2024-09-03 PROCEDURE — 88307 TISSUE EXAM BY PATHOLOGIST: CPT

## 2024-09-03 PROCEDURE — A4217 STERILE WATER/SALINE, 500 ML: HCPCS | Performed by: OTOLARYNGOLOGY

## 2024-09-03 PROCEDURE — C1713 ANCHOR/SCREW BN/BN,TIS/BN: HCPCS | Performed by: OTOLARYNGOLOGY

## 2024-09-03 PROCEDURE — 82330 ASSAY OF CALCIUM: CPT

## 2024-09-03 PROCEDURE — G0378 HOSPITAL OBSERVATION PER HR: HCPCS

## 2024-09-03 PROCEDURE — 83970 ASSAY OF PARATHORMONE: CPT

## 2024-09-03 PROCEDURE — 2709999900 HC NON-CHARGEABLE SUPPLY: Performed by: OTOLARYNGOLOGY

## 2024-09-03 PROCEDURE — 3700000001 HC ADD 15 MINUTES (ANESTHESIA): Performed by: OTOLARYNGOLOGY

## 2024-09-03 PROCEDURE — 6370000000 HC RX 637 (ALT 250 FOR IP): Performed by: OTOLARYNGOLOGY

## 2024-09-03 PROCEDURE — 3700000000 HC ANESTHESIA ATTENDED CARE: Performed by: OTOLARYNGOLOGY

## 2024-09-03 PROCEDURE — 3600000012 HC SURGERY LEVEL 2 ADDTL 15MIN: Performed by: OTOLARYNGOLOGY

## 2024-09-03 PROCEDURE — 7100000001 HC PACU RECOVERY - ADDTL 15 MIN: Performed by: OTOLARYNGOLOGY

## 2024-09-03 PROCEDURE — 3600000002 HC SURGERY LEVEL 2 BASE: Performed by: OTOLARYNGOLOGY

## 2024-09-03 PROCEDURE — 81025 URINE PREGNANCY TEST: CPT

## 2024-09-03 PROCEDURE — 2720000010 HC SURG SUPPLY STERILE: Performed by: OTOLARYNGOLOGY

## 2024-09-03 PROCEDURE — 6360000002 HC RX W HCPCS: Performed by: NURSE ANESTHETIST, CERTIFIED REGISTERED

## 2024-09-03 PROCEDURE — 2500000003 HC RX 250 WO HCPCS: Performed by: NURSE ANESTHETIST, CERTIFIED REGISTERED

## 2024-09-03 PROCEDURE — 6370000000 HC RX 637 (ALT 250 FOR IP): Performed by: ANESTHESIOLOGY

## 2024-09-03 PROCEDURE — 2700000000 HC OXYGEN THERAPY PER DAY

## 2024-09-03 PROCEDURE — 2500000003 HC RX 250 WO HCPCS: Performed by: OTOLARYNGOLOGY

## 2024-09-03 RX ORDER — GLYCOPYRROLATE 0.2 MG/ML
INJECTION INTRAMUSCULAR; INTRAVENOUS PRN
Status: DISCONTINUED | OUTPATIENT
Start: 2024-09-03 | End: 2024-09-03 | Stop reason: SDUPTHER

## 2024-09-03 RX ORDER — ACETAMINOPHEN 500 MG
1000 TABLET ORAL ONCE
Status: COMPLETED | OUTPATIENT
Start: 2024-09-03 | End: 2024-09-03

## 2024-09-03 RX ORDER — MAGNESIUM HYDROXIDE 1200 MG/15ML
LIQUID ORAL CONTINUOUS PRN
Status: COMPLETED | OUTPATIENT
Start: 2024-09-03 | End: 2024-09-03

## 2024-09-03 RX ORDER — SODIUM CHLORIDE 0.9 % (FLUSH) 0.9 %
5-40 SYRINGE (ML) INJECTION PRN
Status: DISCONTINUED | OUTPATIENT
Start: 2024-09-03 | End: 2024-09-03 | Stop reason: HOSPADM

## 2024-09-03 RX ORDER — ALBUTEROL SULFATE 0.83 MG/ML
2.5 SOLUTION RESPIRATORY (INHALATION) EVERY 6 HOURS PRN
Status: DISCONTINUED | OUTPATIENT
Start: 2024-09-03 | End: 2024-09-04 | Stop reason: HOSPADM

## 2024-09-03 RX ORDER — PHENYLEPHRINE HCL IN 0.9% NACL 1 MG/10 ML
SYRINGE (ML) INTRAVENOUS PRN
Status: DISCONTINUED | OUTPATIENT
Start: 2024-09-03 | End: 2024-09-03 | Stop reason: SDUPTHER

## 2024-09-03 RX ORDER — SODIUM CHLORIDE 0.9 % (FLUSH) 0.9 %
5-40 SYRINGE (ML) INJECTION EVERY 12 HOURS SCHEDULED
Status: DISCONTINUED | OUTPATIENT
Start: 2024-09-03 | End: 2024-09-04 | Stop reason: HOSPADM

## 2024-09-03 RX ORDER — DROPERIDOL 2.5 MG/ML
0.62 INJECTION, SOLUTION INTRAMUSCULAR; INTRAVENOUS
Status: DISCONTINUED | OUTPATIENT
Start: 2024-09-03 | End: 2024-09-03 | Stop reason: HOSPADM

## 2024-09-03 RX ORDER — IPRATROPIUM BROMIDE AND ALBUTEROL SULFATE 2.5; .5 MG/3ML; MG/3ML
1 SOLUTION RESPIRATORY (INHALATION)
Status: DISCONTINUED | OUTPATIENT
Start: 2024-09-03 | End: 2024-09-03 | Stop reason: HOSPADM

## 2024-09-03 RX ORDER — SODIUM CHLORIDE 0.9 % (FLUSH) 0.9 %
5-40 SYRINGE (ML) INJECTION EVERY 12 HOURS SCHEDULED
Status: DISCONTINUED | OUTPATIENT
Start: 2024-09-03 | End: 2024-09-03 | Stop reason: HOSPADM

## 2024-09-03 RX ORDER — FENTANYL CITRATE 50 UG/ML
25 INJECTION, SOLUTION INTRAMUSCULAR; INTRAVENOUS EVERY 5 MIN PRN
Status: DISCONTINUED | OUTPATIENT
Start: 2024-09-03 | End: 2024-09-03 | Stop reason: HOSPADM

## 2024-09-03 RX ORDER — LIDOCAINE HYDROCHLORIDE 20 MG/ML
INJECTION, SOLUTION EPIDURAL; INFILTRATION; INTRACAUDAL; PERINEURAL PRN
Status: DISCONTINUED | OUTPATIENT
Start: 2024-09-03 | End: 2024-09-03 | Stop reason: SDUPTHER

## 2024-09-03 RX ORDER — LABETALOL HYDROCHLORIDE 5 MG/ML
10 INJECTION, SOLUTION INTRAVENOUS
Status: DISCONTINUED | OUTPATIENT
Start: 2024-09-03 | End: 2024-09-03 | Stop reason: HOSPADM

## 2024-09-03 RX ORDER — OXYCODONE HYDROCHLORIDE 5 MG/1
5 TABLET ORAL EVERY 4 HOURS PRN
Status: DISCONTINUED | OUTPATIENT
Start: 2024-09-03 | End: 2024-09-04 | Stop reason: HOSPADM

## 2024-09-03 RX ORDER — VENLAFAXINE 50 MG/1
25 TABLET ORAL 3 TIMES DAILY
Status: DISCONTINUED | OUTPATIENT
Start: 2024-09-03 | End: 2024-09-04 | Stop reason: HOSPADM

## 2024-09-03 RX ORDER — ONDANSETRON 2 MG/ML
4 INJECTION INTRAMUSCULAR; INTRAVENOUS
Status: DISCONTINUED | OUTPATIENT
Start: 2024-09-03 | End: 2024-09-03 | Stop reason: HOSPADM

## 2024-09-03 RX ORDER — FENTANYL CITRATE 50 UG/ML
INJECTION, SOLUTION INTRAMUSCULAR; INTRAVENOUS PRN
Status: DISCONTINUED | OUTPATIENT
Start: 2024-09-03 | End: 2024-09-03 | Stop reason: SDUPTHER

## 2024-09-03 RX ORDER — SODIUM CHLORIDE 9 MG/ML
INJECTION, SOLUTION INTRAVENOUS PRN
Status: DISCONTINUED | OUTPATIENT
Start: 2024-09-03 | End: 2024-09-04 | Stop reason: HOSPADM

## 2024-09-03 RX ORDER — DEXAMETHASONE SODIUM PHOSPHATE 4 MG/ML
INJECTION, SOLUTION INTRA-ARTICULAR; INTRALESIONAL; INTRAMUSCULAR; INTRAVENOUS; SOFT TISSUE PRN
Status: DISCONTINUED | OUTPATIENT
Start: 2024-09-03 | End: 2024-09-03 | Stop reason: SDUPTHER

## 2024-09-03 RX ORDER — OXYCODONE HYDROCHLORIDE 5 MG/1
5 TABLET ORAL
Status: DISCONTINUED | OUTPATIENT
Start: 2024-09-03 | End: 2024-09-03 | Stop reason: HOSPADM

## 2024-09-03 RX ORDER — LIDOCAINE HYDROCHLORIDE AND EPINEPHRINE 10; 10 MG/ML; UG/ML
INJECTION, SOLUTION INFILTRATION; PERINEURAL PRN
Status: DISCONTINUED | OUTPATIENT
Start: 2024-09-03 | End: 2024-09-03 | Stop reason: ALTCHOICE

## 2024-09-03 RX ORDER — DEXAMETHASONE SODIUM PHOSPHATE 10 MG/ML
6 INJECTION, SOLUTION INTRAMUSCULAR; INTRAVENOUS EVERY 6 HOURS
Status: COMPLETED | OUTPATIENT
Start: 2024-09-03 | End: 2024-09-03

## 2024-09-03 RX ORDER — SUCCINYLCHOLINE/SOD CL,ISO/PF 100 MG/5ML
SYRINGE (ML) INTRAVENOUS PRN
Status: DISCONTINUED | OUTPATIENT
Start: 2024-09-03 | End: 2024-09-03 | Stop reason: SDUPTHER

## 2024-09-03 RX ORDER — NALOXONE HYDROCHLORIDE 0.4 MG/ML
INJECTION, SOLUTION INTRAMUSCULAR; INTRAVENOUS; SUBCUTANEOUS PRN
Status: DISCONTINUED | OUTPATIENT
Start: 2024-09-03 | End: 2024-09-03 | Stop reason: HOSPADM

## 2024-09-03 RX ORDER — ACETAMINOPHEN 325 MG/1
650 TABLET ORAL EVERY 4 HOURS PRN
Status: DISCONTINUED | OUTPATIENT
Start: 2024-09-03 | End: 2024-09-04 | Stop reason: HOSPADM

## 2024-09-03 RX ORDER — METOCLOPRAMIDE HYDROCHLORIDE 5 MG/ML
INJECTION INTRAMUSCULAR; INTRAVENOUS PRN
Status: DISCONTINUED | OUTPATIENT
Start: 2024-09-03 | End: 2024-09-03 | Stop reason: SDUPTHER

## 2024-09-03 RX ORDER — ONDANSETRON 2 MG/ML
4 INJECTION INTRAMUSCULAR; INTRAVENOUS EVERY 6 HOURS PRN
Status: DISCONTINUED | OUTPATIENT
Start: 2024-09-03 | End: 2024-09-04 | Stop reason: HOSPADM

## 2024-09-03 RX ORDER — ROCURONIUM BROMIDE 10 MG/ML
INJECTION, SOLUTION INTRAVENOUS PRN
Status: DISCONTINUED | OUTPATIENT
Start: 2024-09-03 | End: 2024-09-03 | Stop reason: SDUPTHER

## 2024-09-03 RX ORDER — SODIUM CHLORIDE 0.9 % (FLUSH) 0.9 %
5-40 SYRINGE (ML) INJECTION PRN
Status: DISCONTINUED | OUTPATIENT
Start: 2024-09-03 | End: 2024-09-04 | Stop reason: HOSPADM

## 2024-09-03 RX ORDER — DEXMEDETOMIDINE HYDROCHLORIDE 100 UG/ML
INJECTION, SOLUTION INTRAVENOUS PRN
Status: DISCONTINUED | OUTPATIENT
Start: 2024-09-03 | End: 2024-09-03 | Stop reason: SDUPTHER

## 2024-09-03 RX ORDER — ACETAMINOPHEN 500 MG
1000 TABLET ORAL EVERY 6 HOURS PRN
COMMUNITY

## 2024-09-03 RX ORDER — ONDANSETRON 4 MG/1
4 TABLET, ORALLY DISINTEGRATING ORAL EVERY 8 HOURS PRN
Status: DISCONTINUED | OUTPATIENT
Start: 2024-09-03 | End: 2024-09-04 | Stop reason: HOSPADM

## 2024-09-03 RX ORDER — OXYCODONE HYDROCHLORIDE 5 MG/1
10 TABLET ORAL EVERY 4 HOURS PRN
Status: DISCONTINUED | OUTPATIENT
Start: 2024-09-03 | End: 2024-09-04 | Stop reason: HOSPADM

## 2024-09-03 RX ORDER — PROPOFOL 10 MG/ML
INJECTION, EMULSION INTRAVENOUS PRN
Status: DISCONTINUED | OUTPATIENT
Start: 2024-09-03 | End: 2024-09-03 | Stop reason: SDUPTHER

## 2024-09-03 RX ORDER — DIPHENHYDRAMINE HYDROCHLORIDE 50 MG/ML
12.5 INJECTION INTRAMUSCULAR; INTRAVENOUS
Status: DISCONTINUED | OUTPATIENT
Start: 2024-09-03 | End: 2024-09-03 | Stop reason: HOSPADM

## 2024-09-03 RX ORDER — SODIUM CHLORIDE, SODIUM LACTATE, POTASSIUM CHLORIDE, CALCIUM CHLORIDE 600; 310; 30; 20 MG/100ML; MG/100ML; MG/100ML; MG/100ML
INJECTION, SOLUTION INTRAVENOUS CONTINUOUS
Status: DISCONTINUED | OUTPATIENT
Start: 2024-09-03 | End: 2024-09-04 | Stop reason: HOSPADM

## 2024-09-03 RX ORDER — HYDROMORPHONE HYDROCHLORIDE 1 MG/ML
0.5 INJECTION, SOLUTION INTRAMUSCULAR; INTRAVENOUS; SUBCUTANEOUS EVERY 5 MIN PRN
Status: DISCONTINUED | OUTPATIENT
Start: 2024-09-03 | End: 2024-09-03 | Stop reason: HOSPADM

## 2024-09-03 RX ORDER — SODIUM CHLORIDE 9 MG/ML
INJECTION, SOLUTION INTRAVENOUS PRN
Status: DISCONTINUED | OUTPATIENT
Start: 2024-09-03 | End: 2024-09-03 | Stop reason: HOSPADM

## 2024-09-03 RX ORDER — MIDAZOLAM HYDROCHLORIDE 1 MG/ML
INJECTION INTRAMUSCULAR; INTRAVENOUS PRN
Status: DISCONTINUED | OUTPATIENT
Start: 2024-09-03 | End: 2024-09-03 | Stop reason: SDUPTHER

## 2024-09-03 RX ORDER — MEPERIDINE HYDROCHLORIDE 50 MG/ML
12.5 INJECTION INTRAMUSCULAR; INTRAVENOUS; SUBCUTANEOUS AS NEEDED
Status: DISCONTINUED | OUTPATIENT
Start: 2024-09-03 | End: 2024-09-03 | Stop reason: HOSPADM

## 2024-09-03 RX ORDER — SODIUM CHLORIDE, SODIUM LACTATE, POTASSIUM CHLORIDE, CALCIUM CHLORIDE 600; 310; 30; 20 MG/100ML; MG/100ML; MG/100ML; MG/100ML
INJECTION, SOLUTION INTRAVENOUS CONTINUOUS
Status: DISCONTINUED | OUTPATIENT
Start: 2024-09-03 | End: 2024-09-03 | Stop reason: HOSPADM

## 2024-09-03 RX ORDER — ONDANSETRON 2 MG/ML
INJECTION INTRAMUSCULAR; INTRAVENOUS PRN
Status: DISCONTINUED | OUTPATIENT
Start: 2024-09-03 | End: 2024-09-03 | Stop reason: SDUPTHER

## 2024-09-03 RX ORDER — ALBUTEROL SULFATE 90 UG/1
2 AEROSOL, METERED RESPIRATORY (INHALATION) EVERY 6 HOURS PRN
Status: DISCONTINUED | OUTPATIENT
Start: 2024-09-03 | End: 2024-09-03 | Stop reason: CLARIF

## 2024-09-03 RX ADMIN — MIDAZOLAM 2 MG: 1 INJECTION INTRAMUSCULAR; INTRAVENOUS at 08:03

## 2024-09-03 RX ADMIN — LIDOCAINE HYDROCHLORIDE 80 MG: 20 INJECTION, SOLUTION EPIDURAL; INFILTRATION; INTRACAUDAL; PERINEURAL at 08:10

## 2024-09-03 RX ADMIN — DEXMEDETOMIDINE 3 MCG: 100 INJECTION, SOLUTION, CONCENTRATE INTRAVENOUS at 08:34

## 2024-09-03 RX ADMIN — DEXAMETHASONE SODIUM PHOSPHATE 6 MG: 10 INJECTION, SOLUTION INTRAMUSCULAR; INTRAVENOUS at 12:03

## 2024-09-03 RX ADMIN — MIDAZOLAM 2 MG: 1 INJECTION INTRAMUSCULAR; INTRAVENOUS at 08:07

## 2024-09-03 RX ADMIN — DEXMEDETOMIDINE 2 MCG: 100 INJECTION, SOLUTION, CONCENTRATE INTRAVENOUS at 09:04

## 2024-09-03 RX ADMIN — ACETAMINOPHEN 1000 MG: 500 TABLET ORAL at 06:18

## 2024-09-03 RX ADMIN — HYDROMORPHONE HYDROCHLORIDE 1 MG: 1 INJECTION, SOLUTION INTRAMUSCULAR; INTRAVENOUS; SUBCUTANEOUS at 08:20

## 2024-09-03 RX ADMIN — METOCLOPRAMIDE 10 MG: 5 INJECTION, SOLUTION INTRAMUSCULAR; INTRAVENOUS at 09:04

## 2024-09-03 RX ADMIN — SODIUM CHLORIDE, POTASSIUM CHLORIDE, SODIUM LACTATE AND CALCIUM CHLORIDE: 600; 310; 30; 20 INJECTION, SOLUTION INTRAVENOUS at 12:02

## 2024-09-03 RX ADMIN — ROCURONIUM BROMIDE 5 MG: 10 INJECTION, SOLUTION INTRAVENOUS at 08:10

## 2024-09-03 RX ADMIN — Medication 100 MG: at 08:10

## 2024-09-03 RX ADMIN — DEXAMETHASONE SODIUM PHOSPHATE 8 MG: 4 INJECTION INTRA-ARTICULAR; INTRALESIONAL; INTRAMUSCULAR; INTRAVENOUS; SOFT TISSUE at 08:10

## 2024-09-03 RX ADMIN — SODIUM CHLORIDE, PRESERVATIVE FREE 10 ML: 5 INJECTION INTRAVENOUS at 12:03

## 2024-09-03 RX ADMIN — DEXAMETHASONE SODIUM PHOSPHATE 6 MG: 10 INJECTION, SOLUTION INTRAMUSCULAR; INTRAVENOUS at 23:47

## 2024-09-03 RX ADMIN — WATER 2000 MG: 1 INJECTION INTRAMUSCULAR; INTRAVENOUS; SUBCUTANEOUS at 08:10

## 2024-09-03 RX ADMIN — OXYCODONE HYDROCHLORIDE 5 MG: 5 TABLET ORAL at 12:03

## 2024-09-03 RX ADMIN — VENLAFAXINE HYDROCHLORIDE 25 MG: 50 TABLET ORAL at 20:30

## 2024-09-03 RX ADMIN — OXYCODONE HYDROCHLORIDE 10 MG: 5 TABLET ORAL at 17:27

## 2024-09-03 RX ADMIN — FENTANYL CITRATE 100 MCG: 50 INJECTION, SOLUTION INTRAMUSCULAR; INTRAVENOUS at 08:10

## 2024-09-03 RX ADMIN — SODIUM CHLORIDE, SODIUM LACTATE, POTASSIUM CHLORIDE, AND CALCIUM CHLORIDE: 600; 310; 30; 20 INJECTION, SOLUTION INTRAVENOUS at 09:30

## 2024-09-03 RX ADMIN — PROPOFOL 50 MG: 10 INJECTION, EMULSION INTRAVENOUS at 08:45

## 2024-09-03 RX ADMIN — SODIUM CHLORIDE, SODIUM LACTATE, POTASSIUM CHLORIDE, AND CALCIUM CHLORIDE: 600; 310; 30; 20 INJECTION, SOLUTION INTRAVENOUS at 06:29

## 2024-09-03 RX ADMIN — ONDANSETRON 4 MG: 2 INJECTION INTRAMUSCULAR; INTRAVENOUS at 09:46

## 2024-09-03 RX ADMIN — Medication 100 MCG: at 09:43

## 2024-09-03 RX ADMIN — VENLAFAXINE HYDROCHLORIDE 25 MG: 50 TABLET ORAL at 14:17

## 2024-09-03 RX ADMIN — OXYCODONE HYDROCHLORIDE 10 MG: 5 TABLET ORAL at 23:47

## 2024-09-03 RX ADMIN — PROPOFOL 250 MG: 10 INJECTION, EMULSION INTRAVENOUS at 08:10

## 2024-09-03 RX ADMIN — Medication 100 MCG: at 09:51

## 2024-09-03 RX ADMIN — DEXMEDETOMIDINE 2 MCG: 100 INJECTION, SOLUTION, CONCENTRATE INTRAVENOUS at 08:45

## 2024-09-03 RX ADMIN — GLYCOPYRROLATE 0.2 MG: 0.2 INJECTION INTRAMUSCULAR; INTRAVENOUS at 08:26

## 2024-09-03 RX ADMIN — DEXMEDETOMIDINE 3 MCG: 100 INJECTION, SOLUTION, CONCENTRATE INTRAVENOUS at 08:24

## 2024-09-03 RX ADMIN — DEXAMETHASONE SODIUM PHOSPHATE 6 MG: 10 INJECTION, SOLUTION INTRAMUSCULAR; INTRAVENOUS at 17:27

## 2024-09-03 ASSESSMENT — PAIN - FUNCTIONAL ASSESSMENT
PAIN_FUNCTIONAL_ASSESSMENT: ACTIVITIES ARE NOT PREVENTED
PAIN_FUNCTIONAL_ASSESSMENT: 0-10

## 2024-09-03 ASSESSMENT — PAIN DESCRIPTION - PAIN TYPE
TYPE: SURGICAL PAIN

## 2024-09-03 ASSESSMENT — PAIN DESCRIPTION - DESCRIPTORS
DESCRIPTORS: SORE

## 2024-09-03 ASSESSMENT — PAIN DESCRIPTION - ORIENTATION
ORIENTATION: ANTERIOR

## 2024-09-03 ASSESSMENT — PAIN SCALES - GENERAL
PAINLEVEL_OUTOF10: 7
PAINLEVEL_OUTOF10: 6
PAINLEVEL_OUTOF10: 9
PAINLEVEL_OUTOF10: 5
PAINLEVEL_OUTOF10: 3
PAINLEVEL_OUTOF10: 5
PAINLEVEL_OUTOF10: 0
PAINLEVEL_OUTOF10: 6
PAINLEVEL_OUTOF10: 4
PAINLEVEL_OUTOF10: 6

## 2024-09-03 ASSESSMENT — PAIN DESCRIPTION - LOCATION
LOCATION: NECK

## 2024-09-03 NOTE — INTERVAL H&P NOTE
Update History & Physical    The patient's History and Physical of August 13, 2024 was reviewed with the patient and I examined the patient. There was no change. The surgical site was confirmed by the patient and me.     Plan: The risks, benefits, expected outcome, and alternative to the recommended procedure have been discussed with the patient. Patient understands and wants to proceed with the procedure.     Electronically signed by Haja Rizo DO on 9/3/2024 at 7:53 AM

## 2024-09-03 NOTE — OP NOTE
[address]  [address]                            OPERATIVE REPORT      PATIENT NAME: KIEL RAO           : 1985  MED REC NO: [id number]                       ROOM: OR  ACCOUNT NO: [id number]                       ADMIT DATE: 2024  PROVIDER: Haja Rizo DO    DATE OF SERVICE:  2024    PREOPERATIVE DIAGNOSES:  Multinodular goiter.    POSTOPERATIVE DIAGNOSES:  Multinodular goiter.    PROCEDURES PERFORMED:  Total thyroidectomy.    SURGEON:  Haja Rizo DO    ASSISTANT:  None.    ANESTHESIA:  General endotracheal.    ESTIMATED BLOOD LOSS:  100 mL.    SPECIMENS REMOVED:  Sent permanent to pathology.    INTRAOPERATIVE FINDINGS:  Large multinodular goiter.     COMPLICATIONS:  None.    IMPLANTS:  none    INDICATIONS:  This is a 39-year-old female with the above diagnosis.  The risks, benefits, and alternatives of operative intervention were discussed with the patient preoperatively.  She stated her understanding and desired to proceed.    DESCRIPTION OF PROCEDURE:  The patient was brought into the operating room and placed supine on the operating table.  The patient was induced with a general anesthetic with a Medtronic nerve integrity monitor laryngeal tube, that was present and on during the entire case.  A surgical pause was performed and a 6 cm anterior neck incision was diagrammed and injected with 1% xylocaine with 1:100,000 epinephrine.  The patient was then prepped and draped in standard sterile fashion.  Another surgical pause performed.  A #15 was blade used to incise the skin and subcutaneous tissues.  This was carried down to the midline.  The strap musculature was then vertically divided.  Hemostasis was obtained with monopolar and bipolar cautery.  Once the strap musculature was dissected, the thyroid isthmus was identified and then blunt dissection along the right thyroid lobe was completed.  Several  feeding vessels to the gland were controlled with bipolar cautery.  I then turned back to the thyroid isthmus.  The isthmus was dissected out off the anterior trachea and the Thunderbeat Harmonic scalpel was used for a significant amount in this area for both hemostasis and dissection.  I then dissected to the superior pole along the cricothyroid muscle.  I was able to control the superior pole vessels with bipolar cautery and the Thunderbeat device.  I then turned inferiorly and dissected some feeding vessels to the inferior pole and then along the medial edge.  I was able to identify the right inferior parathyroid and then followed by the right superior parathyroid.  I then dissected down to the tracheoesophageal groove both visually and electrophysiologically identified the recurrent laryngeal nerve and dissected the remaining portion of the right thyroid lobe away from the recurrent laryngeal nerve.  That portion of the specimen was passed off and stitched.  It will be part of the complete specimen.  The right side was then packed off with a Ray-Nelson and attention was directed to the left-hand side.  Blunt dissection was carried along the left thyroid lobe and then several feeding vessels to the lobe were controlled.  The middle thyroid vein was identified and controlled.  I went back to Berry fascia, dissected this off with bipolar cautery and then went along the cricothyroid muscle and hemostasis was obtained with bipolar cautery and the Thunderbeat device.  I then turned back inferiorly.  I was not able to identify a left inferior parathyroid, but then I was able to identify a left superior parathyroid, which was preserved.  The gland was now pedicled on the tracheoesophageal groove, I identified visually and electrophysiologically the recurrent laryngeal nerve and the remaining portion of the left thyroid gland was dissected away and passed off the field as specimen.  Copious irrigation was performed.

## 2024-09-03 NOTE — PERIOP NOTE
TRANSFER - IN REPORT:    Verbal report received from Amrita Mckee CRNA RN  on Naima Hickman  being received from OR for routine progression of patient care      Report consisted of patient's Situation, Background, Assessment and   Recommendations(SBAR).     Information from the following report(s) Adult Overview, Surgery Report, Intake/Output, and MAR was reviewed with the receiving nurse.    Opportunity for questions and clarification was provided.      Assessment completed upon patient's arrival to unit and care assumed.

## 2024-09-03 NOTE — PERIOP NOTE
Reviewed PTA medication list with patient/caregiver and patient/caregiver denies any additional medications.     Patient admits to having a responsible adult care for them at home for at least 24 hours after surgery.    Patient encouraged to use gown warming system and informed that using said warming gown to regulate body temperature prior to a procedure has been shown to help reduce the risks of blood clots and infection.    Patient's pharmacy of choice verified and documented in PTA medication section.    Dual skin assessment & fall risk band verification completed with Iris DENNY RN.

## 2024-09-03 NOTE — CARE COORDINATION
Case Management Surgical Short Stay (Low Risk)  Screening Assessment       Planned scheduled admission for Total thyroidectomy                Admit date :  9/3/24    CM met with patient at bedside.      Demographics confirmed   YES   Y es or No   YES / NO: Yes  Insurance confirmed  YES  Yes or No  CM Role explained  YES  Yes  or No  DME at home  NO   Yes or No    Prior HH or active with HH  NO  Yes or N.o   If yes:  Agency  N/A  Able to get prescriptions: YES  Yes or No  Has transportation confirmed for dc home :  YES  Yes or No      No dc needs identified at this time    Signature: Zen Esparza RN Case Manager

## 2024-09-03 NOTE — OP NOTE
Operative Report    Indications: This is a 39 yrs female who presents with mng. She was positive for same.  The patient was admitted for surgery as conservative measures have failed.    Date of Procedure: 9/3/2024     Procedure Name: Procedure(s):  TOTAL THYROIDECTOMY OP 23    Preoperative Diagnosis: Nontoxic multinodular goiter [E04.2]    Postoperative Diagnosis:  Same    Surgeon(s):  Haja Rizo DO    Assistant: None    Anesthesia:  General    Findings:  large goiter right > left    Estimated Blood Loss:  100ml    Specimens:   ID Type Source Tests Collected by Time Destination   A : TOTAL THYROIDECTOMY- STITCH IN RIGHT SIDE Tissue Thyroid SURGICAL PATHOLOGY Haja Rizo DO 9/3/2024 0912              Implants: * No implants in log *         Complications:  None; patient tolerated the procedure well.    Signed By: Haja Rizo DO     September 3, 2024

## 2024-09-03 NOTE — PLAN OF CARE
Problem: Pain  Goal: Verbalizes/displays adequate comfort level or baseline comfort level  Outcome: Progressing  Flowsheets  Taken 9/3/2024 1057 by Anushka Johnston, RN  Verbalizes/displays adequate comfort level or baseline comfort level: Assess pain using appropriate pain scale  Taken 9/3/2024 1035 by Anushka Johnston, RN  Verbalizes/displays adequate comfort level or baseline comfort level: Assess pain using appropriate pain scale  Taken 9/3/2024 1008 by Anushka Johnston, RN  Verbalizes/displays adequate comfort level or baseline comfort level: Assess pain using appropriate pain scale     Problem: Discharge Planning  Goal: Discharge to home or other facility with appropriate resources  Outcome: Progressing

## 2024-09-03 NOTE — PROGRESS NOTES
TRANSFER - IN REPORT:    Verbal report received from LESLIE Figueroa RN on Naima Hickman  being received from PACU for routine post-op      Report consisted of patient's Situation, Background, Assessment and   Recommendations(SBAR).     Information from the following report(s) Nurse Handoff Report, Adult Overview, Surgery Report, Intake/Output, MAR, and Recent Results was reviewed with the receiving nurse.    Opportunity for questions and clarification was provided.      Assessment completed upon patient's arrival to unit and care assumed.

## 2024-09-03 NOTE — PROGRESS NOTES
TRANSFER - OUT REPORT:  Patient transferred to unit 201 with nurse Rich fong at bedside. VS stable. Dual incision site assessed.     Verbal report given to Rich Fong RN on Naima Hickman  being transferred to [address] [place] [address]  for routine post-op       Report consisted of patient's Situation, Background, Assessment and   Recommendations(SBAR).     Information from the following report(s) Nurse Handoff Report, Adult Overview, Surgery Report, Intake/Output, MAR, and Cardiac Rhythm Sr  was reviewed with the receiving nurse.           Lines:   Peripheral IV 09/03/24 Posterior;Right Forearm (Active)   Site Assessment Clean, dry & intact 09/03/24 1057   Line Status Infusing 09/03/24 1057   Line Care Connections checked and tightened 09/03/24 1040   Phlebitis Assessment No symptoms 09/03/24 1040   Infiltration Assessment 0 09/03/24 1040   Dressing Status Clean, dry & intact 09/03/24 1040   Dressing Type Transparent 09/03/24 0636   Dressing Intervention New 09/03/24 0636        Opportunity for questions and clarification was provided.      Patient transported with:  O2 @ 2lpm and Registered Nurse    /72   Pulse 100   Temp 97.4 °F (36.3 °C) (Oral)   Resp 16   Ht 1.727 m (5' 8\")   Wt 77.1 kg (170 lb)   LMP 07/05/2024 (Exact Date) Comment: irregular periods  SpO2 100%   BMI 25.85 kg/m²

## 2024-09-03 NOTE — ANESTHESIA POSTPROCEDURE EVALUATION
Department of Anesthesiology  Postprocedure Note    Patient: Naima Hickman  MRN: [id number]  YOB: 1985  Date of evaluation: 9/3/2024    Procedure Summary       Date: 09/03/24 Room / Location: [facility] MAIN 02 / [facility] MAIN OR    Anesthesia Start: 0803 Anesthesia Stop: 1013    Procedure: TOTAL THYROIDECTOMY OP 23 (Neck) Diagnosis:       Nontoxic multinodular goiter      (Nontoxic multinodular goiter [E04.2])    Surgeons: Haja Rizo DO Responsible Provider: Janes Irwin MD    Anesthesia Type: General ASA Status: 2            Anesthesia Type: General    Anisha Phase I: Anisha Score: 9    Anisha Phase II:      Anesthesia Post Evaluation    Comments: Post-Anesthesia Evaluation and Assessment    Cardiovascular Function/Vital Signs/Pain Score  Vitals  BP: 118/73  Temp: 97.5 °F (36.4 °C)  Temp Source: Oral  Pulse: 83  Respirations: 15  SpO2: 100 %  Height: 172.7 cm (5' 8\")  Weight - Scale: 77.1 kg (170 lb)  Pain Level: 4 (Medication given)     Patient is status post Procedure(s):  TOTAL THYROIDECTOMY OP 23.    Nausea/Vomiting: Controlled.    Postoperative hydration reviewed and adequate.    Pain:  Managed.    Neurological Status:   At baseline.    Mental Status and Level of Consciousness: Arousable.    Pulmonary Status:   Adequate oxygenation and airway patent.    Complications related to anesthesia: None    Post-anesthesia assessment completed. No concerns.    Patient has met all discharge requirements.    Signed By: Janes Irwin MD    September 3, 2024     No notable events documented.

## 2024-09-03 NOTE — PROGRESS NOTES
Received client from PACU in satisfactory condition. Client is a pt of Dr. Rizo. Pt had a total thyroidectomy today. Client is A/O X 4. Client is calm and cooperative. Pt denies numbness or tingling to all extremities. Palpated Radial,Posterior Tibial and Dorsalis Pedis pulses equal in rate and rhythm bilaterally-+2. Capillary Refill less than 3 seconds. Skin is warm , dry and skin color is appropriate to race. Client is negative for JVD.  Bibasilar breath sounds clear bilaterally. No use of accessory muscles. Bowel sounds hypoactive to all 4 quadrants. Abdomen is soft and non-tender. Client has not voided post-operatively. No bladder distention evident. No complaints of bladder discomfort. Client has a dermabond-open to air dressing to the anterior neck. Dressing is clean, dry, and intact. No other skin integrity issues present.Sequential compression device applied. Client has 20 gauge PIV present in right forearm and running LR @125ml/hr. Clients pain is 3/10 on 0-10 scale. Waiting on pharmacy to verify medications. Client oriented to call bell use as well as bed use. Client oriented to phone and how to order meals. Call bell within reach. Bed in low position. Three side rails up. Dual skin assessment performed with LESLIE Figueroa RN. No skin breakdown noted at this time. Emergency trach set is at bedside.

## 2024-09-04 VITALS
HEART RATE: 82 BPM | RESPIRATION RATE: 15 BRPM | SYSTOLIC BLOOD PRESSURE: 130 MMHG | HEIGHT: 68 IN | OXYGEN SATURATION: 100 % | BODY MASS INDEX: 25.76 KG/M2 | TEMPERATURE: 98.3 F | WEIGHT: 170 LBS | DIASTOLIC BLOOD PRESSURE: 70 MMHG

## 2024-09-04 LAB — CA-I SERPL-SCNC: 1.15 MMOL/L (ref 1.12–1.32)

## 2024-09-04 PROCEDURE — G0378 HOSPITAL OBSERVATION PER HR: HCPCS

## 2024-09-04 PROCEDURE — 36415 COLL VENOUS BLD VENIPUNCTURE: CPT

## 2024-09-04 PROCEDURE — 6370000000 HC RX 637 (ALT 250 FOR IP): Performed by: OTOLARYNGOLOGY

## 2024-09-04 PROCEDURE — 82330 ASSAY OF CALCIUM: CPT

## 2024-09-04 RX ADMIN — VENLAFAXINE HYDROCHLORIDE 25 MG: 50 TABLET ORAL at 08:24

## 2024-09-04 RX ADMIN — OXYCODONE HYDROCHLORIDE 5 MG: 5 TABLET ORAL at 08:23

## 2024-09-04 ASSESSMENT — PAIN DESCRIPTION - LOCATION
LOCATION: NECK;THROAT
LOCATION: NECK;THROAT

## 2024-09-04 ASSESSMENT — PAIN DESCRIPTION - DESCRIPTORS
DESCRIPTORS: SORE
DESCRIPTORS: SORE

## 2024-09-04 ASSESSMENT — PAIN SCALES - GENERAL
PAINLEVEL_OUTOF10: 5
PAINLEVEL_OUTOF10: 4

## 2024-09-04 ASSESSMENT — PAIN DESCRIPTION - PAIN TYPE
TYPE: SURGICAL PAIN
TYPE: SURGICAL PAIN

## 2024-09-04 ASSESSMENT — PAIN - FUNCTIONAL ASSESSMENT
PAIN_FUNCTIONAL_ASSESSMENT: ACTIVITIES ARE NOT PREVENTED
PAIN_FUNCTIONAL_ASSESSMENT: ACTIVITIES ARE NOT PREVENTED

## 2024-09-04 ASSESSMENT — PAIN DESCRIPTION - ORIENTATION
ORIENTATION: ANTERIOR
ORIENTATION: ANTERIOR

## 2024-09-04 NOTE — PLAN OF CARE
Problem: Pain  Goal: Verbalizes/displays adequate comfort level or baseline comfort level  Outcome: Complete     Problem: Discharge Planning  Goal: Discharge to home or other facility with appropriate resources  Outcome: Complete     Problem: Safety - Adult  Goal: Free from fall injury  Outcome: Complete     Problem: ABCDS Injury Assessment  Goal: Absence of physical injury  Outcome: Complete

## 2024-09-04 NOTE — PROGRESS NOTES
1905 - Assumed care at this time.     2022 - Patient A&Ox4, RA. Denies chest pain and SOB. Dermabond dressing to anterior neck C/D/I. Pt c/o mild difficulty swallowing, pt educated on s/s to report. Denies numbness/tingling/calf pain.  Pain 5/10 with a tolerable level of 5/10. SCD compression device and TEDs bilaterally. Pt educated on unit routine, IS use, q2h rounds, and pain management. Pt verbalized understanding, no concerns voiced. Call bell and telephone within reach, bed in lowest position. Trach set at bedside. Pt encouraged to call for assistance via call bell/zone phone.

## 2024-09-04 NOTE — PROGRESS NOTES
0720  Assumed care of Pt. Pt is A&O x4. IV is patent and infusing NS@125ml/hr. Dressing is clean, dry, and intact- open to air dermabond. Pt asked for guaze and tape per MD to cover the incision before she goes home. Pt denies chest pain and SOB. Pt denies numbness/tingling to all extremities. Pt states pain is 5/10. Bed locked in lowest position, call bell in reach.     0815  Assessment performed-see flowsheet. AM medications given, Pt tolerated well. PRN 5mg roxicodone given for pain 5/10, Pt tolerated well. Pt put non-adherent gauze and tape over incision on anterior neck.     0930  AVS meds reviewed with DUANE Carrizales. IV removed-catheter intact, Pt tolerated well. Discharge instructions reviewed and given to Pt and family at bedside. Pt and family both state they have no questions or concerns at this time. Transportation placed for Pt.     0941  Transportation has arrived with wheelchair to take Pt downstairs to medical pavilion for discharge. Pt is now off of the unit.     1038  Pt called and notified that earphones that were left in room 201 were taken downstairs by security and will be held for her to pickup.

## 2024-09-04 NOTE — PROGRESS NOTES
[facility] ENT ~ Allergy Daily Progress Note     Assessment:     POD 1 Total thyroidectomy, doing well      Plan:     D/c home today 9/4  May shower  Pain meds as rx'ed prn  Start synthroid as rx'ed  5. Diet as tolerated  6. F/u 2 weeks    Subjective:     Patient's condition is Good.taking po, voice good    Objective:     Visit Vitals  /81   Pulse 74   Temp 97.6 °F (36.4 °C) (Oral)   Resp 17   Ht 1.727 m (5' 8\")   Wt 77.1 kg (170 lb)   SpO2 100%   BMI 25.85 kg/m²       Intake and Output:  Current Shift:  09/03 1901 - 09/04 0700  In: 1675 [P.O.:550; I.V.:1125]  Out: 1950 [Urine:1950]  Last three shifts:  09/02 0701 - 09/03 1900  In: 1200 [I.V.:1200]  Out: 850 [Urine:750]    Lab/Data Reviewed:  Recent Results (from the past 24 hour(s))   PTH, Post-Additional, Intra-Op    Collection Time: 09/03/24 10:00 AM   Result Value Ref Range    IO-PTH Time drawn 1,000      IO-PTH Post additional 52.1 8 - 74 pg/mL   Calcium, Ionized    Collection Time: 09/03/24 10:00 AM   Result Value Ref Range    Calcium, Ionized 1.10 (L) 1.12 - 1.32 MMOL/L   Calcium, Ionized    Collection Time: 09/04/24  4:29 AM   Result Value Ref Range    Calcium, Ionized 1.15 1.12 - 1.32 MMOL/L       Current Facility-Administered Medications   Medication Dose Route Frequency Provider Last Rate Last Admin    lactated ringers IV soln infusion   IntraVENous Continuous Haja Rizo  mL/hr at 09/03/24 0803 New Bag at 09/03/24 0930    venlafaxine (EFFEXOR) tablet 25 mg  25 mg Oral TID Haja Rizo DO   25 mg at 09/03/24 2030    sodium chloride flush 0.9 % injection 5-40 mL  5-40 mL IntraVENous 2 times per day Haja Rizo DO   10 mL at 09/03/24 1203    sodium chloride flush 0.9 % injection 5-40 mL  5-40 mL IntraVENous PRN Haja Rizo DO        0.9 % sodium chloride infusion   IntraVENous PRN Haja Rizo DO        ondansetron (ZOFRAN-ODT) disintegrating tablet 4 mg  4 mg Oral Q8H PRN Haja Rizo DO        Or    ondansetron (ZOFRAN)

## 2024-09-04 NOTE — PROGRESS NOTES
Bedside and Verbal shift change report given to HIREN Fong RN (oncoming nurse) by LUIS Jones RN (offgoing nurse). Report included the following information Nurse Handoff Report, Adult Overview, Surgery Report, Intake/Output, MAR, and Recent Results, UPDATES.

## (undated) DEVICE — MINOR: Brand: MEDLINE INDUSTRIES, INC.

## (undated) DEVICE — SUTURE MONOCRYL + SZ 4-0 L27IN ABSRB UD L19MM PS-2 3/8 CIR MCP426H

## (undated) DEVICE — YANKAUER,FLEXIBLE HANDLE,REGLR CAPACITY: Brand: MEDLINE INDUSTRIES, INC.

## (undated) DEVICE — GAUZE,SPONGE,8"X4",12PLY,XRAY,STRL,LF: Brand: MEDLINE

## (undated) DEVICE — PROBE 8225101 5PK STD PRASS FL TIP ROHS

## (undated) DEVICE — LIQUIBAND RAPID ADHESIVE 36/CS 0.8ML: Brand: MEDLINE

## (undated) DEVICE — GARMENT,MEDLINE,DVT,INT,CALF,MED, GEN2: Brand: MEDLINE

## (undated) DEVICE — DRAPE,THYROID,SOFT,STERILE: Brand: MEDLINE

## (undated) DEVICE — 1010 S-DRAPE TOWEL DRAPE 10/BX: Brand: STERI-DRAPE™

## (undated) DEVICE — HEAD DONUT FOAM POSITIONER: Brand: CARDINAL HEALTH

## (undated) DEVICE — ROUND DISSECTORS: Brand: DEROYAL

## (undated) DEVICE — APPLICATOR MEDICATED 26 CC SOLUTION HI LT ORNG CHLORAPREP

## (undated) DEVICE — 3M™ STERI-DRAPE™ INSTRUMENT POUCH 1018: Brand: STERI-DRAPE™

## (undated) DEVICE — Z DISCONTINUED USE 2218136 SUTURE ETHILON SZ 3-0 L30IN NONABSORBABLE BLK FSL L30MM 3/8 1671H

## (undated) DEVICE — SUTURE MONOCRYL SZ 3-0 L27IN ABSRB UD L26MM SH 1/2 CIR Y416H

## (undated) DEVICE — THUNDERBEAT OPEN FINE JAW TYPE X: Brand: THUNDERBEAT OPEN FINE JAW TYPE X

## (undated) DEVICE — SOLUTION IRRIG 500ML 0.9% SOD CHLO USP POUR PLAS BTL

## (undated) DEVICE — DISPOSABLE HARDY BAYONET INSULATED BIPOLAR FORCEPS: Brand: INTEGRA® JARIT®

## (undated) DEVICE — MASTISOL ADHESIVE LIQ 2/3ML

## (undated) DEVICE — SUTURE PERMA-HAND SZ 2-0 L30IN NONABSORBABLE BLK L26MM SH K833H

## (undated) DEVICE — KIT HEMSTAT AGNT 2GM ENT HEMADERM W/ FLEXTIP APPL

## (undated) DEVICE — Device

## (undated) DEVICE — EMG TUBE 8229707 NIM TRIVANTAGE 7.0MM ID: Brand: NIM TRIVANTAGE™

## (undated) DEVICE — STERILE POLYISOPRENE POWDER-FREE SURGICAL GLOVES: Brand: PROTEXIS

## (undated) DEVICE — STANDARD HYPODERMIC NEEDLE,POLYPROPYLENE HUB: Brand: MONOJECT

## (undated) DEVICE — DRAPE,INSTRUMENT,MAGNETIC,10X16: Brand: MEDLINE